# Patient Record
Sex: FEMALE | Race: WHITE | NOT HISPANIC OR LATINO | URBAN - METROPOLITAN AREA
[De-identification: names, ages, dates, MRNs, and addresses within clinical notes are randomized per-mention and may not be internally consistent; named-entity substitution may affect disease eponyms.]

---

## 2018-10-22 ENCOUNTER — EMERGENCY (EMERGENCY)
Facility: HOSPITAL | Age: 83
LOS: 0 days | Discharge: HOME | End: 2018-10-22
Attending: EMERGENCY MEDICINE | Admitting: EMERGENCY MEDICINE
Payer: MEDICARE

## 2018-10-22 VITALS
RESPIRATION RATE: 18 BRPM | OXYGEN SATURATION: 94 % | SYSTOLIC BLOOD PRESSURE: 170 MMHG | WEIGHT: 143.08 LBS | TEMPERATURE: 99 F | DIASTOLIC BLOOD PRESSURE: 92 MMHG | HEART RATE: 64 BPM | HEIGHT: 64 IN

## 2018-10-22 VITALS — SYSTOLIC BLOOD PRESSURE: 165 MMHG | DIASTOLIC BLOOD PRESSURE: 87 MMHG | RESPIRATION RATE: 16 BRPM | HEART RATE: 68 BPM
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
## 2018-10-22 DIAGNOSIS — S09.90XA UNSPECIFIED INJURY OF HEAD, INITIAL ENCOUNTER: ICD-10-CM

## 2018-10-22 DIAGNOSIS — I10 ESSENTIAL (PRIMARY) HYPERTENSION: ICD-10-CM

## 2018-10-22 DIAGNOSIS — M48.00 SPINAL STENOSIS, SITE UNSPECIFIED: ICD-10-CM

## 2018-10-22 DIAGNOSIS — E11.9 TYPE 2 DIABETES MELLITUS WITHOUT COMPLICATIONS: ICD-10-CM

## 2018-10-22 DIAGNOSIS — F03.90 UNSPECIFIED DEMENTIA WITHOUT BEHAVIORAL DISTURBANCE: ICD-10-CM
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
## 2018-10-22 PROCEDURE — 93010 ELECTROCARDIOGRAM REPORT: CPT

## 2018-10-22 PROCEDURE — 71045 X-RAY EXAM CHEST 1 VIEW: CPT | Mod: 26

## 2018-10-22 PROCEDURE — 72125 CT NECK SPINE W/O DYE: CPT | Mod: 26

## 2018-10-22 PROCEDURE — 72170 X-RAY EXAM OF PELVIS: CPT | Mod: 26

## 2018-10-22 PROCEDURE — 70450 CT HEAD/BRAIN W/O DYE: CPT | Mod: 26

## 2018-10-22 PROCEDURE — 99284 EMERGENCY DEPT VISIT MOD MDM: CPT

## 2018-10-22 NOTE — ED PROVIDER NOTE - MEDICAL DECISION MAKING DETAILS
89 y/o f with hx of dementia, on baby ASA, BIBA from assisted living s/p witnessed fall with questionable head injury. Pt without memory of accident, PE unremarkable. Plan for trauma alert, CT head and c-spine, XR chest/pelvis, EKG, observe, reevaluate. Ambulation trial if studies negative.

## 2018-10-22 NOTE — ED PROVIDER NOTE - MUSCULOSKELETAL, MLM
Spine appears normal, range of motion is not limited, no muscle or joint tenderness. BL SLR 45 degrees without pain, good FROM.  Neck nontender, supple without pain. Chest wall nontender, stable. pelvis nontender, stable. Back nontender, stable. BEAVERS x4. Spine appears normal, range of motion is not limited, no muscle or joint tenderness. BL SLR 45 degrees without pain, good AFROM all large jts w/o pain.  Neck nontender, supple without pain. Chest wall nontender, stable. pelvis nontender, stable. Back nontender, stable. BEAVERS x4.

## 2018-10-22 NOTE — ED ADULT TRIAGE NOTE - CHIEF COMPLAINT QUOTE
pt BIBEMS s/p witnessed mechanical fall @ NH. no LOC. + head strike. c/o pain to R shoulder and L hip. on daily ASA. L leg not noted to be shortened or rotated. pt w/ dementia. trauma alert activated upon arrival to ED.

## 2018-10-22 NOTE — ED ADULT NURSE NOTE - NSIMPLEMENTINTERV_GEN_ALL_ED
Implemented All Fall with Harm Risk Interventions:  [place] to call system. Call bell, personal items and telephone within reach. Instruct patient to call for assistance. Room bathroom lighting operational. Non-slip footwear when patient is off stretcher. Physically safe environment: no spills, clutter or unnecessary equipment. Stretcher in lowest position, wheels locked, appropriate side rails in place. Provide visual cue, wrist band, yellow gown, etc. Monitor gait and stability. Monitor for mental status changes and reorient to person, place, and time. Review medications for side effects contributing to fall risk. Reinforce activity limits and safety measures with patient and family. Provide visual clues: red socks.

## 2018-10-22 NOTE — ED PROVIDER NOTE - NEUROLOGICAL, MLM
Alert and oriented to person and place, no focal deficits, no motor or sensory deficits. Cranial nerves 2-12 intact. No speech deficit.

## 2018-10-22 NOTE — ED PROVIDER NOTE - OBJECTIVE STATEMENT
89 y/o f with PMHx of dementia, DM2, HTN, spinal stenosis presenting to the ED BIBEMS from [facility] assisted living for evaluation s/p witnessed fall from standing today. Pt c/o right shoulder pain, right HA, left hip pain as per EMS. On 81mg ASA. Pt unable to provide hx secondary to dementia, with retrograde amnesia of event, denies any pain currently.

## 2018-10-22 NOTE — ED PROVIDER NOTE - SKIN, MLM
Skin normal color for race, warm, dry and intact. No evidence of rash. No obvious external evidence of trauma

## 2018-12-15 ENCOUNTER — EMERGENCY (EMERGENCY)
Facility: HOSPITAL | Age: 83
LOS: 0 days | Discharge: HOME | End: 2018-12-15
Attending: EMERGENCY MEDICINE | Admitting: EMERGENCY MEDICINE
Payer: MEDICARE

## 2018-12-15 VITALS
DIASTOLIC BLOOD PRESSURE: 52 MMHG | RESPIRATION RATE: 18 BRPM | OXYGEN SATURATION: 96 % | HEART RATE: 70 BPM | WEIGHT: 169.98 LBS | TEMPERATURE: 98 F | HEIGHT: 65 IN | SYSTOLIC BLOOD PRESSURE: 143 MMHG

## 2018-12-15 VITALS
SYSTOLIC BLOOD PRESSURE: 141 MMHG | DIASTOLIC BLOOD PRESSURE: 51 MMHG | OXYGEN SATURATION: 97 % | HEART RATE: 60 BPM | RESPIRATION RATE: 17 BRPM | TEMPERATURE: 98 F

## 2018-12-15 DIAGNOSIS — E11.9 TYPE 2 DIABETES MELLITUS WITHOUT COMPLICATIONS: ICD-10-CM

## 2018-12-15 DIAGNOSIS — Z04.3 ENCOUNTER FOR EXAMINATION AND OBSERVATION FOLLOWING OTHER ACCIDENT: ICD-10-CM

## 2018-12-15 DIAGNOSIS — I10 ESSENTIAL (PRIMARY) HYPERTENSION: ICD-10-CM

## 2018-12-15 DIAGNOSIS — F03.90 UNSPECIFIED DEMENTIA WITHOUT BEHAVIORAL DISTURBANCE: ICD-10-CM

## 2018-12-15 DIAGNOSIS — Z79.84 LONG TERM (CURRENT) USE OF ORAL HYPOGLYCEMIC DRUGS: ICD-10-CM
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
## 2018-12-15 PROBLEM — M48.00 SPINAL STENOSIS, SITE UNSPECIFIED: Chronic | Status: ACTIVE | Noted: 2018-10-22

## 2018-12-15 PROCEDURE — 70450 CT HEAD/BRAIN W/O DYE: CPT | Mod: 26

## 2018-12-15 PROCEDURE — 72125 CT NECK SPINE W/O DYE: CPT | Mod: 26

## 2018-12-15 PROCEDURE — 99284 EMERGENCY DEPT VISIT MOD MDM: CPT | Mod: 25

## 2018-12-15 NOTE — ED ADULT NURSE NOTE - NSIMPLEMENTINTERV_GEN_ALL_ED
Implemented All Fall with Harm Risk Interventions:  [place] to call system. Call bell, personal items and telephone within reach. Instruct patient to call for assistance. Room bathroom lighting operational. Non-slip footwear when patient is off stretcher. Physically safe environment: no spills, clutter or unnecessary equipment. Stretcher in lowest position, wheels locked, appropriate side rails in place. Provide visual cue, wrist band, yellow gown, etc. Monitor gait and stability. Monitor for mental status changes and reorient to person, place, and time. Review medications for side effects contributing to fall risk. Reinforce activity limits and safety measures with patient and family. Provide visual clues: red socks.

## 2018-12-15 NOTE — ED ADULT NURSE NOTE - OBJECTIVE STATEMENT
Pt presents to ER from assisted living s/p witnessed fall out of bed. Pt denies pain or any complaints. Denies hitting head. No signs of bleeding/trauma/deformity. Pt confused at baseline. Normal breathing pattern with no difficulty, equal b/l chest discomfort.

## 2018-12-15 NOTE — ED PROVIDER NOTE - OBJECTIVE STATEMENT
89 y/o female in ED from NH s/p fall.   as per EMS, unwitnessed fall with pt found on floor.   pt denies any complaints.   no obvious trauma noted.

## 2018-12-15 NOTE — ED ADULT TRIAGE NOTE - CHIEF COMPLAINT QUOTE
Pt presents to the ED from [place], for unwitnessed fall, pt denies any pain at triage, at baseline mental status at triage. Pt denies hitting head at triage, denies head pain.

## 2018-12-15 NOTE — ED ADULT NURSE NOTE - CHIEF COMPLAINT QUOTE
Pt presents to the ED from [place], for unwitnessed fall, pt denies any pain at triage, at baseline mental status at triage. Pt denies hitting head at triage, denies head pain.

## 2018-12-18 ENCOUNTER — INPATIENT (INPATIENT)
Facility: HOSPITAL | Age: 83
LOS: 3 days | Discharge: NURSING, REHAB OR LONG-TERM CARE | End: 2018-12-22
Attending: INTERNAL MEDICINE | Admitting: INTERNAL MEDICINE
Payer: MEDICARE

## 2018-12-18 VITALS
DIASTOLIC BLOOD PRESSURE: 54 MMHG | HEIGHT: 64 IN | HEART RATE: 61 BPM | OXYGEN SATURATION: 95 % | TEMPERATURE: 98 F | RESPIRATION RATE: 20 BRPM | SYSTOLIC BLOOD PRESSURE: 147 MMHG | WEIGHT: 139.99 LBS

## 2018-12-18 NOTE — ED PROVIDER NOTE - MUSCULOSKELETAL, MLM
Spine appears normal, range of motion is not limited, ttp left shoulder, no swelling, no ecchymoisis no abrasion, pain on movement left hip with grimacing on movement

## 2018-12-18 NOTE — ED PROVIDER NOTE - OBJECTIVE STATEMENT
89 yo female biba from nh with ho dementia on aspirin with fall c/o left arm and left hip pain. no visible signs of trauma pt is oriented to person only. as per ems no loc

## 2018-12-19 LAB
ABO RH CONFIRMATION: SIGNIFICANT CHANGE UP
ALBUMIN SERPL ELPH-MCNC: 3.2 G/DL — LOW (ref 3.3–5)
ALBUMIN SERPL ELPH-MCNC: 3.5 G/DL — SIGNIFICANT CHANGE UP (ref 3.3–5)
ALP SERPL-CCNC: 93 U/L — SIGNIFICANT CHANGE UP (ref 40–120)
ALP SERPL-CCNC: 93 U/L — SIGNIFICANT CHANGE UP (ref 40–120)
ALT FLD-CCNC: 22 U/L — SIGNIFICANT CHANGE UP (ref 12–78)
ALT FLD-CCNC: 26 U/L — SIGNIFICANT CHANGE UP (ref 12–78)
ANION GAP SERPL CALC-SCNC: 8 MMOL/L — SIGNIFICANT CHANGE UP (ref 5–17)
ANION GAP SERPL CALC-SCNC: 8 MMOL/L — SIGNIFICANT CHANGE UP (ref 5–17)
APPEARANCE UR: ABNORMAL
APTT BLD: 28.8 SEC — SIGNIFICANT CHANGE UP (ref 27.5–36.3)
APTT BLD: 29.1 SEC — SIGNIFICANT CHANGE UP (ref 27.5–36.3)
AST SERPL-CCNC: 15 U/L — SIGNIFICANT CHANGE UP (ref 15–37)
AST SERPL-CCNC: 19 U/L — SIGNIFICANT CHANGE UP (ref 15–37)
BACTERIA # UR AUTO: ABNORMAL
BASOPHILS # BLD AUTO: 0.02 K/UL — SIGNIFICANT CHANGE UP (ref 0–0.2)
BASOPHILS # BLD AUTO: 0.04 K/UL — SIGNIFICANT CHANGE UP (ref 0–0.2)
BASOPHILS NFR BLD AUTO: 0.1 % — SIGNIFICANT CHANGE UP (ref 0–2)
BASOPHILS NFR BLD AUTO: 0.2 % — SIGNIFICANT CHANGE UP (ref 0–2)
BILIRUB SERPL-MCNC: 0.2 MG/DL — SIGNIFICANT CHANGE UP (ref 0.2–1.2)
BILIRUB SERPL-MCNC: 0.2 MG/DL — SIGNIFICANT CHANGE UP (ref 0.2–1.2)
BILIRUB UR-MCNC: NEGATIVE — SIGNIFICANT CHANGE UP
BLD GP AB SCN SERPL QL: SIGNIFICANT CHANGE UP
BUN SERPL-MCNC: 26 MG/DL — HIGH (ref 7–23)
BUN SERPL-MCNC: 26 MG/DL — HIGH (ref 7–23)
CALCIUM SERPL-MCNC: 8.9 MG/DL — SIGNIFICANT CHANGE UP (ref 8.5–10.1)
CALCIUM SERPL-MCNC: 9.6 MG/DL — SIGNIFICANT CHANGE UP (ref 8.5–10.1)
CHLORIDE SERPL-SCNC: 105 MMOL/L — SIGNIFICANT CHANGE UP (ref 96–108)
CHLORIDE SERPL-SCNC: 105 MMOL/L — SIGNIFICANT CHANGE UP (ref 96–108)
CO2 SERPL-SCNC: 26 MMOL/L — SIGNIFICANT CHANGE UP (ref 22–31)
CO2 SERPL-SCNC: 27 MMOL/L — SIGNIFICANT CHANGE UP (ref 22–31)
COLOR SPEC: YELLOW — SIGNIFICANT CHANGE UP
CREAT SERPL-MCNC: 1.2 MG/DL — SIGNIFICANT CHANGE UP (ref 0.5–1.3)
CREAT SERPL-MCNC: 1.21 MG/DL — SIGNIFICANT CHANGE UP (ref 0.5–1.3)
DIFF PNL FLD: ABNORMAL
EOSINOPHIL # BLD AUTO: 0.01 K/UL — SIGNIFICANT CHANGE UP (ref 0–0.5)
EOSINOPHIL # BLD AUTO: 0.02 K/UL — SIGNIFICANT CHANGE UP (ref 0–0.5)
EOSINOPHIL NFR BLD AUTO: 0.1 % — SIGNIFICANT CHANGE UP (ref 0–6)
EOSINOPHIL NFR BLD AUTO: 0.1 % — SIGNIFICANT CHANGE UP (ref 0–6)
EPI CELLS # UR: SIGNIFICANT CHANGE UP
GLUCOSE BLDC GLUCOMTR-MCNC: 165 MG/DL — HIGH (ref 70–99)
GLUCOSE BLDC GLUCOMTR-MCNC: 190 MG/DL — HIGH (ref 70–99)
GLUCOSE BLDC GLUCOMTR-MCNC: 192 MG/DL — HIGH (ref 70–99)
GLUCOSE BLDC GLUCOMTR-MCNC: 215 MG/DL — HIGH (ref 70–99)
GLUCOSE SERPL-MCNC: 221 MG/DL — HIGH (ref 70–99)
GLUCOSE SERPL-MCNC: 236 MG/DL — HIGH (ref 70–99)
GLUCOSE UR QL: 100 MG/DL
HBA1C BLD-MCNC: 6.3 % — HIGH (ref 4–5.6)
HCT VFR BLD CALC: 31.8 % — LOW (ref 34.5–45)
HCT VFR BLD CALC: 34.2 % — LOW (ref 34.5–45)
HGB BLD-MCNC: 10.3 G/DL — LOW (ref 11.5–15.5)
HGB BLD-MCNC: 10.9 G/DL — LOW (ref 11.5–15.5)
IMM GRANULOCYTES NFR BLD AUTO: 0.5 % — SIGNIFICANT CHANGE UP (ref 0–1.5)
IMM GRANULOCYTES NFR BLD AUTO: 0.5 % — SIGNIFICANT CHANGE UP (ref 0–1.5)
INR BLD: 1.04 RATIO — SIGNIFICANT CHANGE UP (ref 0.88–1.16)
INR BLD: 1.08 RATIO — SIGNIFICANT CHANGE UP (ref 0.88–1.16)
KETONES UR-MCNC: ABNORMAL
LACTATE SERPL-SCNC: 2.4 MMOL/L — HIGH (ref 0.7–2)
LACTATE SERPL-SCNC: 2.5 MMOL/L — HIGH (ref 0.7–2)
LEUKOCYTE ESTERASE UR-ACNC: ABNORMAL
LYMPHOCYTES # BLD AUTO: 1.52 K/UL — SIGNIFICANT CHANGE UP (ref 1–3.3)
LYMPHOCYTES # BLD AUTO: 1.67 K/UL — SIGNIFICANT CHANGE UP (ref 1–3.3)
LYMPHOCYTES # BLD AUTO: 11.1 % — LOW (ref 13–44)
LYMPHOCYTES # BLD AUTO: 8.8 % — LOW (ref 13–44)
MAGNESIUM SERPL-MCNC: 2 MG/DL — SIGNIFICANT CHANGE UP (ref 1.6–2.6)
MCHC RBC-ENTMCNC: 31.2 PG — SIGNIFICANT CHANGE UP (ref 27–34)
MCHC RBC-ENTMCNC: 31.6 PG — SIGNIFICANT CHANGE UP (ref 27–34)
MCHC RBC-ENTMCNC: 31.9 GM/DL — LOW (ref 32–36)
MCHC RBC-ENTMCNC: 32.4 GM/DL — SIGNIFICANT CHANGE UP (ref 32–36)
MCV RBC AUTO: 96.4 FL — SIGNIFICANT CHANGE UP (ref 80–100)
MCV RBC AUTO: 99.1 FL — SIGNIFICANT CHANGE UP (ref 80–100)
MONOCYTES # BLD AUTO: 0.77 K/UL — SIGNIFICANT CHANGE UP (ref 0–0.9)
MONOCYTES # BLD AUTO: 0.96 K/UL — HIGH (ref 0–0.9)
MONOCYTES NFR BLD AUTO: 5.1 % — SIGNIFICANT CHANGE UP (ref 2–14)
MONOCYTES NFR BLD AUTO: 5.5 % — SIGNIFICANT CHANGE UP (ref 2–14)
NEUTROPHILS # BLD AUTO: 12.45 K/UL — HIGH (ref 1.8–7.4)
NEUTROPHILS # BLD AUTO: 14.68 K/UL — HIGH (ref 1.8–7.4)
NEUTROPHILS NFR BLD AUTO: 83.1 % — HIGH (ref 43–77)
NEUTROPHILS NFR BLD AUTO: 84.9 % — HIGH (ref 43–77)
NITRITE UR-MCNC: POSITIVE
NRBC # BLD: 0 /100 WBCS — SIGNIFICANT CHANGE UP (ref 0–0)
NRBC # BLD: 0 /100 WBCS — SIGNIFICANT CHANGE UP (ref 0–0)
PH UR: 7 — SIGNIFICANT CHANGE UP (ref 5–8)
PHOSPHATE SERPL-MCNC: 3.1 MG/DL — SIGNIFICANT CHANGE UP (ref 2.5–4.5)
PLATELET # BLD AUTO: 261 K/UL — SIGNIFICANT CHANGE UP (ref 150–400)
PLATELET # BLD AUTO: 269 K/UL — SIGNIFICANT CHANGE UP (ref 150–400)
POTASSIUM SERPL-MCNC: 4.5 MMOL/L — SIGNIFICANT CHANGE UP (ref 3.5–5.3)
POTASSIUM SERPL-MCNC: 4.7 MMOL/L — SIGNIFICANT CHANGE UP (ref 3.5–5.3)
POTASSIUM SERPL-SCNC: 4.5 MMOL/L — SIGNIFICANT CHANGE UP (ref 3.5–5.3)
POTASSIUM SERPL-SCNC: 4.7 MMOL/L — SIGNIFICANT CHANGE UP (ref 3.5–5.3)
PROT SERPL-MCNC: 7.5 GM/DL — SIGNIFICANT CHANGE UP (ref 6–8.3)
PROT SERPL-MCNC: 8.1 GM/DL — SIGNIFICANT CHANGE UP (ref 6–8.3)
PROT UR-MCNC: 30 MG/DL
PROTHROM AB SERPL-ACNC: 11.6 SEC — SIGNIFICANT CHANGE UP (ref 10–12.9)
PROTHROM AB SERPL-ACNC: 12 SEC — SIGNIFICANT CHANGE UP (ref 10–12.9)
RBC # BLD: 3.3 M/UL — LOW (ref 3.8–5.2)
RBC # BLD: 3.45 M/UL — LOW (ref 3.8–5.2)
RBC # FLD: 12.2 % — SIGNIFICANT CHANGE UP (ref 10.3–14.5)
RBC # FLD: 12.2 % — SIGNIFICANT CHANGE UP (ref 10.3–14.5)
RBC CASTS # UR COMP ASSIST: ABNORMAL /HPF (ref 0–4)
SODIUM SERPL-SCNC: 139 MMOL/L — SIGNIFICANT CHANGE UP (ref 135–145)
SODIUM SERPL-SCNC: 140 MMOL/L — SIGNIFICANT CHANGE UP (ref 135–145)
SP GR SPEC: 1.01 — SIGNIFICANT CHANGE UP (ref 1.01–1.02)
TYPE + AB SCN PNL BLD: SIGNIFICANT CHANGE UP
UROBILINOGEN FLD QL: 1 MG/DL
WBC # BLD: 14.99 K/UL — HIGH (ref 3.8–10.5)
WBC # BLD: 17.31 K/UL — HIGH (ref 3.8–10.5)
WBC # FLD AUTO: 14.99 K/UL — HIGH (ref 3.8–10.5)
WBC # FLD AUTO: 17.31 K/UL — HIGH (ref 3.8–10.5)
WBC UR QL: SIGNIFICANT CHANGE UP

## 2018-12-19 PROCEDURE — 71045 X-RAY EXAM CHEST 1 VIEW: CPT | Mod: 26

## 2018-12-19 PROCEDURE — 99233 SBSQ HOSP IP/OBS HIGH 50: CPT

## 2018-12-19 PROCEDURE — 70450 CT HEAD/BRAIN W/O DYE: CPT | Mod: 26

## 2018-12-19 PROCEDURE — 72125 CT NECK SPINE W/O DYE: CPT | Mod: 26

## 2018-12-19 PROCEDURE — 73503 X-RAY EXAM HIP UNI 4/> VIEWS: CPT | Mod: 26,LT

## 2018-12-19 PROCEDURE — 72192 CT PELVIS W/O DYE: CPT | Mod: 26

## 2018-12-19 PROCEDURE — 73030 X-RAY EXAM OF SHOULDER: CPT | Mod: 26,LT

## 2018-12-19 PROCEDURE — 99285 EMERGENCY DEPT VISIT HI MDM: CPT

## 2018-12-19 PROCEDURE — 76376 3D RENDER W/INTRP POSTPROCES: CPT | Mod: 26

## 2018-12-19 PROCEDURE — 93010 ELECTROCARDIOGRAM REPORT: CPT

## 2018-12-19 RX ORDER — MORPHINE SULFATE 50 MG/1
2 CAPSULE, EXTENDED RELEASE ORAL ONCE
Qty: 0 | Refills: 0 | Status: DISCONTINUED | OUTPATIENT
Start: 2018-12-19 | End: 2018-12-19

## 2018-12-19 RX ORDER — DOCUSATE SODIUM 100 MG
100 CAPSULE ORAL THREE TIMES A DAY
Qty: 0 | Refills: 0 | Status: DISCONTINUED | OUTPATIENT
Start: 2018-12-19 | End: 2018-12-22

## 2018-12-19 RX ORDER — DEXTROSE 50 % IN WATER 50 %
12.5 SYRINGE (ML) INTRAVENOUS ONCE
Qty: 0 | Refills: 0 | Status: DISCONTINUED | OUTPATIENT
Start: 2018-12-19 | End: 2018-12-22

## 2018-12-19 RX ORDER — CEFTRIAXONE 500 MG/1
1000 INJECTION, POWDER, FOR SOLUTION INTRAMUSCULAR; INTRAVENOUS EVERY 24 HOURS
Qty: 0 | Refills: 0 | Status: DISCONTINUED | OUTPATIENT
Start: 2018-12-20 | End: 2018-12-22

## 2018-12-19 RX ORDER — IBUPROFEN 200 MG
400 TABLET ORAL EVERY 8 HOURS
Qty: 0 | Refills: 0 | Status: COMPLETED | OUTPATIENT
Start: 2018-12-19 | End: 2018-12-22

## 2018-12-19 RX ORDER — CEFTRIAXONE 500 MG/1
INJECTION, POWDER, FOR SOLUTION INTRAMUSCULAR; INTRAVENOUS
Qty: 0 | Refills: 0 | Status: DISCONTINUED | OUTPATIENT
Start: 2018-12-19 | End: 2018-12-22

## 2018-12-19 RX ORDER — MEMANTINE HYDROCHLORIDE 10 MG/1
10 TABLET ORAL
Qty: 0 | Refills: 0 | Status: DISCONTINUED | OUTPATIENT
Start: 2018-12-19 | End: 2018-12-22

## 2018-12-19 RX ORDER — DEXTROSE 50 % IN WATER 50 %
25 SYRINGE (ML) INTRAVENOUS ONCE
Qty: 0 | Refills: 0 | Status: DISCONTINUED | OUTPATIENT
Start: 2018-12-19 | End: 2018-12-22

## 2018-12-19 RX ORDER — GLUCAGON INJECTION, SOLUTION 0.5 MG/.1ML
1 INJECTION, SOLUTION SUBCUTANEOUS ONCE
Qty: 0 | Refills: 0 | Status: DISCONTINUED | OUTPATIENT
Start: 2018-12-19 | End: 2018-12-22

## 2018-12-19 RX ORDER — BUPROPION HYDROCHLORIDE 150 MG/1
150 TABLET, EXTENDED RELEASE ORAL DAILY
Qty: 0 | Refills: 0 | Status: DISCONTINUED | OUTPATIENT
Start: 2018-12-19 | End: 2018-12-22

## 2018-12-19 RX ORDER — CHOLECALCIFEROL (VITAMIN D3) 125 MCG
3000 CAPSULE ORAL DAILY
Qty: 0 | Refills: 0 | Status: DISCONTINUED | OUTPATIENT
Start: 2018-12-19 | End: 2018-12-22

## 2018-12-19 RX ORDER — MIRTAZAPINE 45 MG/1
30 TABLET, ORALLY DISINTEGRATING ORAL AT BEDTIME
Qty: 0 | Refills: 0 | Status: DISCONTINUED | OUTPATIENT
Start: 2018-12-19 | End: 2018-12-22

## 2018-12-19 RX ORDER — CHOLECALCIFEROL (VITAMIN D3) 125 MCG
1 CAPSULE ORAL
Qty: 0 | Refills: 0 | COMMUNITY

## 2018-12-19 RX ORDER — INSULIN LISPRO 100/ML
VIAL (ML) SUBCUTANEOUS
Qty: 0 | Refills: 0 | Status: DISCONTINUED | OUTPATIENT
Start: 2018-12-19 | End: 2018-12-22

## 2018-12-19 RX ORDER — DEXTROSE 50 % IN WATER 50 %
15 SYRINGE (ML) INTRAVENOUS ONCE
Qty: 0 | Refills: 0 | Status: DISCONTINUED | OUTPATIENT
Start: 2018-12-19 | End: 2018-12-22

## 2018-12-19 RX ORDER — ACETAMINOPHEN 500 MG
650 TABLET ORAL EVERY 4 HOURS
Qty: 0 | Refills: 0 | Status: DISCONTINUED | OUTPATIENT
Start: 2018-12-19 | End: 2018-12-22

## 2018-12-19 RX ORDER — OXYCODONE HYDROCHLORIDE 5 MG/1
5 TABLET ORAL EVERY 6 HOURS
Qty: 0 | Refills: 0 | Status: DISCONTINUED | OUTPATIENT
Start: 2018-12-19 | End: 2018-12-22

## 2018-12-19 RX ORDER — BUPROPION HYDROCHLORIDE 150 MG/1
1 TABLET, EXTENDED RELEASE ORAL
Qty: 0 | Refills: 0 | COMMUNITY

## 2018-12-19 RX ORDER — CEFTRIAXONE 500 MG/1
INJECTION, POWDER, FOR SOLUTION INTRAMUSCULAR; INTRAVENOUS
Qty: 0 | Refills: 0 | Status: DISCONTINUED | OUTPATIENT
Start: 2018-12-19 | End: 2018-12-19

## 2018-12-19 RX ORDER — MORPHINE SULFATE 50 MG/1
2 CAPSULE, EXTENDED RELEASE ORAL EVERY 6 HOURS
Qty: 0 | Refills: 0 | Status: DISCONTINUED | OUTPATIENT
Start: 2018-12-19 | End: 2018-12-22

## 2018-12-19 RX ORDER — SODIUM CHLORIDE 9 MG/ML
1000 INJECTION, SOLUTION INTRAVENOUS
Qty: 0 | Refills: 0 | Status: DISCONTINUED | OUTPATIENT
Start: 2018-12-19 | End: 2018-12-22

## 2018-12-19 RX ORDER — CEFTRIAXONE 500 MG/1
1000 INJECTION, POWDER, FOR SOLUTION INTRAMUSCULAR; INTRAVENOUS ONCE
Qty: 0 | Refills: 0 | Status: COMPLETED | OUTPATIENT
Start: 2018-12-19 | End: 2018-12-19

## 2018-12-19 RX ORDER — CEFTRIAXONE 500 MG/1
1 INJECTION, POWDER, FOR SOLUTION INTRAMUSCULAR; INTRAVENOUS ONCE
Qty: 0 | Refills: 0 | Status: DISCONTINUED | OUTPATIENT
Start: 2018-12-19 | End: 2018-12-19

## 2018-12-19 RX ORDER — METFORMIN HYDROCHLORIDE 850 MG/1
1 TABLET ORAL
Qty: 0 | Refills: 0 | COMMUNITY

## 2018-12-19 RX ORDER — SODIUM CHLORIDE 9 MG/ML
500 INJECTION INTRAMUSCULAR; INTRAVENOUS; SUBCUTANEOUS
Qty: 0 | Refills: 0 | Status: DISCONTINUED | OUTPATIENT
Start: 2018-12-19 | End: 2018-12-22

## 2018-12-19 RX ORDER — LANOLIN ALCOHOL/MO/W.PET/CERES
5 CREAM (GRAM) TOPICAL AT BEDTIME
Qty: 0 | Refills: 0 | Status: DISCONTINUED | OUTPATIENT
Start: 2018-12-19 | End: 2018-12-22

## 2018-12-19 RX ORDER — SODIUM CHLORIDE 9 MG/ML
500 INJECTION INTRAMUSCULAR; INTRAVENOUS; SUBCUTANEOUS ONCE
Qty: 0 | Refills: 0 | Status: COMPLETED | OUTPATIENT
Start: 2018-12-19 | End: 2018-12-19

## 2018-12-19 RX ORDER — MORPHINE SULFATE 50 MG/1
2 CAPSULE, EXTENDED RELEASE ORAL EVERY 4 HOURS
Qty: 0 | Refills: 0 | Status: DISCONTINUED | OUTPATIENT
Start: 2018-12-19 | End: 2018-12-19

## 2018-12-19 RX ADMIN — Medication 2: at 17:38

## 2018-12-19 RX ADMIN — MEMANTINE HYDROCHLORIDE 10 MILLIGRAM(S): 10 TABLET ORAL at 05:54

## 2018-12-19 RX ADMIN — Medication 400 MILLIGRAM(S): at 23:41

## 2018-12-19 RX ADMIN — SODIUM CHLORIDE 500 MILLILITER(S): 9 INJECTION INTRAMUSCULAR; INTRAVENOUS; SUBCUTANEOUS at 05:46

## 2018-12-19 RX ADMIN — Medication 1: at 12:48

## 2018-12-19 RX ADMIN — Medication 5 MILLIGRAM(S): at 23:41

## 2018-12-19 RX ADMIN — BUPROPION HYDROCHLORIDE 150 MILLIGRAM(S): 150 TABLET, EXTENDED RELEASE ORAL at 12:47

## 2018-12-19 RX ADMIN — Medication 3000 UNIT(S): at 12:47

## 2018-12-19 RX ADMIN — Medication 400 MILLIGRAM(S): at 23:42

## 2018-12-19 RX ADMIN — MORPHINE SULFATE 2 MILLIGRAM(S): 50 CAPSULE, EXTENDED RELEASE ORAL at 05:46

## 2018-12-19 RX ADMIN — CEFTRIAXONE 1000 MILLIGRAM(S): 500 INJECTION, POWDER, FOR SOLUTION INTRAMUSCULAR; INTRAVENOUS at 05:54

## 2018-12-19 RX ADMIN — MORPHINE SULFATE 2 MILLIGRAM(S): 50 CAPSULE, EXTENDED RELEASE ORAL at 06:01

## 2018-12-19 RX ADMIN — MIRTAZAPINE 30 MILLIGRAM(S): 45 TABLET, ORALLY DISINTEGRATING ORAL at 22:03

## 2018-12-19 RX ADMIN — Medication 400 MILLIGRAM(S): at 16:30

## 2018-12-19 RX ADMIN — Medication 400 MILLIGRAM(S): at 15:59

## 2018-12-19 NOTE — PHYSICAL THERAPY INITIAL EVALUATION ADULT - RANGE OF MOTION EXAMINATION, REHAB EVAL
left shoulder NT 2/2 pain/bilateral upper extremity ROM was WFL (within functional limits)/bilateral lower extremity ROM was WFL (within functional limits)

## 2018-12-19 NOTE — CONSULT NOTE ADULT - SUBJECTIVE AND OBJECTIVE BOX
89F with hx of dementia presents to ED from nursing home s/p mechanical fall. Pt now c/o L hip and shoulder pain. Denies HS/LOC. Pt unreliable historian and full history unable to be obtained at this time. Pt oriented to person only. Pt denies any other orthopedic complaints at this time. Unknown if any orthopedic surgeries in the past.    CONSTITUTIONAL: No fever or chills  HEENT:  No headache, no sore throat  RESPIRATORY: No cough, wheezing, or shortness of breath  CARDIOVASCULAR: No chest pain, palpitations, or leg swelling  GASTROINTESTINAL: No nausea, vomiting, or diarrhea  GENITOURINARY: No dysuria, frequency, or hematuria  NEUROLOGICAL: no focal weakness or dizziness  SKIN:  No rashes or lesions   MUSCULOSKELETAL: no myalgias   PSYCHIATRIC: No depression or anxiety    PAST MEDICAL & SURGICAL HISTORY:  Spinal stenosis  Diabetes  HTN (hypertension)  Dementia    Home Medications:  buPROPion 100 mg/12 hours (SR) oral tablet, extended release: 1 tab(s) orally 2 times a day (22 Oct 2018 19:05)  Melatonin 5 mg oral tablet: 1 tab(s) orally once a day (at bedtime) (22 Oct 2018 19:05)  metFORMIN 500 mg oral tablet: 1 tab(s) orally 2 times a day (22 Oct 2018 19:05)  mirtazapine 30 mg oral tablet: 1 tab(s) orally once a day (at bedtime) (22 Oct 2018 19:05)  Namenda 10 mg oral tablet: 1 tab(s) orally 2 times a day (22 Oct 2018 19:05)  pravastatin 40 mg oral tablet: 1 tab(s) orally once a day (22 Oct 2018 19:05)  Vitamin D3 2000 intl units oral capsule: 1 cap(s) orally once a day (22 Oct 2018 19:05)    Allergies    No Known Allergies    Intolerances    Vital Signs Last 24 Hrs  T(C): 36.5 (18 Dec 2018 22:53), Max: 36.5 (18 Dec 2018 22:53)  T(F): 97.7 (18 Dec 2018 22:53), Max: 97.7 (18 Dec 2018 22:53)  HR: 61 (18 Dec 2018 22:53) (61 - 61)  BP: 147/54 (18 Dec 2018 22:53) (147/54 - 147/54)  BP(mean): --  RR: 20 (18 Dec 2018 22:53) (20 - 20)  SpO2: 95% (18 Dec 2018 22:53) (95% - 95%)    Imaging: XR L Shoulder: No obvious fractures (Awaiting official read)  XR/CT Pelvis: L nondisplaced medial wall and anterior acetabular dome fractures    Physical  Gen: NAD, AAOx1  LLE: Skin intact, no erythema/ecchymosis, no ttp over hip, no bony ttp elsewhere, +EHL/FHL/TA/GS, SILT, +dp pulse, compartments soft/compressible, unable to SLR, +pain with log roll/heel strike, no pain with ROM at knee/ankle    Secondary Survey: +ttp L shoulder, No TTP over other bony prominences, SILT, palpable pulses, full/painless range of motion, compartments soft

## 2018-12-19 NOTE — PHYSICAL THERAPY INITIAL EVALUATION ADULT - PERTINENT HX OF CURRENT PROBLEM, REHAB EVAL
89 y/o F with PMH of dementia, depression, HTN, spinal stenosis, DM2, p/w fall. Patient is a poor historian 2/2 dementia. Patient denies LOC, and only complaint is L hip pain. Found to have L acetabular fracture.

## 2018-12-19 NOTE — ED ADULT NURSE NOTE - NSIMPLEMENTINTERV_GEN_ALL_ED
Implemented All Fall with Harm Risk Interventions:  [place] to call system. Call bell, personal items and telephone within reach. Instruct patient to call for assistance. Room bathroom lighting operational. Non-slip footwear when patient is off stretcher. Physically safe environment: no spills, clutter or unnecessary equipment. Stretcher in lowest position, wheels locked, appropriate side rails in place. Provide visual cue, wrist band, yellow gown, etc. Monitor gait and stability. Monitor for mental status changes and reorient to person, place, and time. Review medications for side effects contributing to fall risk. Reinforce activity limits and safety measures with patient and family. Provide visual clues: red socks.

## 2018-12-19 NOTE — H&P ADULT - HISTORY OF PRESENT ILLNESS
89 y/o F with PMH of dementia, depression, HTN, spinal stenosis, DM2, p/w fall. Patient is a poor historian 2/2 dementia. Patient denies LOC, and only complaint is L hip pain. Found to have L acetabular fracture, and was evaluated by ortho at bedside. Ortho does not recommend surgical intervention at this time.    Denies CP, nausea, vomiting, abdominal pain, SOB, cough, runny nose, sore throat, diarrhea, urinary complaints    PSH: Denies    Social hx: Denies x 3, lives at [facility]    Family Hx: Denies

## 2018-12-19 NOTE — PHYSICAL THERAPY INITIAL EVALUATION ADULT - GENERAL OBSERVATIONS, REHAB EVAL
Pt rec'd supine in bed with dtr present to provide hx. Pt endorses left posterior shoulder pain,  cooperative with PT.

## 2018-12-19 NOTE — ED ADULT NURSE REASSESSMENT NOTE - NS ED NURSE REASSESS COMMENT FT1
Patient being treated with IV antibiotics for UTI, as per Dr Nair blood cultures not warranted at this time. Patient complaining of increased pain, MD made aware, pain medication ordered. Will Continue to monitor

## 2018-12-19 NOTE — ED ADULT NURSE REASSESSMENT NOTE - NS ED NURSE REASSESS COMMENT FT1
Received care of pt from Sarahi Underwood RN at change of shift. Pt is admitted to hospital with orders, awaiting report to floor. Pt taken for repeat x-ray. Pt is A&Ox1 which is baseline per offgoing RN

## 2018-12-19 NOTE — CONSULT NOTE ADULT - ASSESSMENT
89F s/p fall, presents with left acetabular fracture, no other acute injuries noted on imaging. .    - ortho plan for acetabular fracture  - no acute trauma surgery intervention needed at this point  - other plans per primary team  - pain control  - monitor vitals  - monitor labs  - no other vascular or neurological deficits associated with the injury

## 2018-12-19 NOTE — CONSULT NOTE ADULT - SUBJECTIVE AND OBJECTIVE BOX
89 y/o F with PMH of dementia, depression, HTN, spinal stenosis, DM2, p/w fall. Patient is a poor historian 2/2 dementia. Patient denies LOC, and only complaint is L hip pain. Found to have L acetabular fracture. Trauma surgery consulted for trauma evaluation and clearance.      Denies CP, nausea, vomiting, abdominal pain, SOB, cough, runny nose, sore throat, diarrhea, urinary complaints. Complaints of mild weakness on the left lower extremity. Patient currently is extremely confused, alert but not oriented.    PSH: Denies      Family Hx: Denies      Physical exam:    gen: alert but not oriented  pulm: equal air entry bilaterally  cv: normal s1s2  abdomen: soft, nontender, nondistended  left lower extremity with mild weakness  mild tender around the left hip on palpation  back : no step offs, non tender.

## 2018-12-19 NOTE — CONSULT NOTE ADULT - ASSESSMENT
89F with nondisplaced L medial wall/anterior dome acetabulum fx  Pain control  NWB LLE  DVT ppx  FU Judet xrays  FU official xray reads  No acute orthopedic surgery intervention planned at this time  Will discuss with attending and advise if plan changes

## 2018-12-19 NOTE — PROGRESS NOTE ADULT - ASSESSMENT
89 y old female S/p fall, with left acetabular and pubic rami fracture. HD stable C/P left shoulder pain x ray negative    Pain control  DVT/GI prophylaxis  Orthopedic following no intervention  NWB LLE  medcial management per primary servie  If shoulder pain is not improving may need MRI   PT  SW for D/C planning  No trauma surgical intervention .  D/W Family at [facility]

## 2018-12-19 NOTE — H&P ADULT - NEUROLOGICAL DETAILS
deep reflexes intact/cranial nerves intact/sensation intact/responds to verbal commands/responds to pain

## 2018-12-20 LAB
GLUCOSE BLDC GLUCOMTR-MCNC: 164 MG/DL — HIGH (ref 70–99)
GLUCOSE BLDC GLUCOMTR-MCNC: 180 MG/DL — HIGH (ref 70–99)
GLUCOSE BLDC GLUCOMTR-MCNC: 186 MG/DL — HIGH (ref 70–99)
GLUCOSE BLDC GLUCOMTR-MCNC: 190 MG/DL — HIGH (ref 70–99)
HCT VFR BLD CALC: 29.9 % — LOW (ref 34.5–45)
HGB BLD-MCNC: 9.7 G/DL — LOW (ref 11.5–15.5)
MCHC RBC-ENTMCNC: 31.8 PG — SIGNIFICANT CHANGE UP (ref 27–34)
MCHC RBC-ENTMCNC: 32.4 GM/DL — SIGNIFICANT CHANGE UP (ref 32–36)
MCV RBC AUTO: 98 FL — SIGNIFICANT CHANGE UP (ref 80–100)
NRBC # BLD: 0 /100 WBCS — SIGNIFICANT CHANGE UP (ref 0–0)
PLATELET # BLD AUTO: 234 K/UL — SIGNIFICANT CHANGE UP (ref 150–400)
RBC # BLD: 3.05 M/UL — LOW (ref 3.8–5.2)
RBC # FLD: 12.4 % — SIGNIFICANT CHANGE UP (ref 10.3–14.5)
WBC # BLD: 15.16 K/UL — HIGH (ref 3.8–10.5)
WBC # FLD AUTO: 15.16 K/UL — HIGH (ref 3.8–10.5)

## 2018-12-20 PROCEDURE — 93010 ELECTROCARDIOGRAM REPORT: CPT

## 2018-12-20 PROCEDURE — 73030 X-RAY EXAM OF SHOULDER: CPT | Mod: 26,LT

## 2018-12-20 RX ADMIN — MEMANTINE HYDROCHLORIDE 10 MILLIGRAM(S): 10 TABLET ORAL at 17:07

## 2018-12-20 RX ADMIN — Medication 1: at 08:34

## 2018-12-20 RX ADMIN — Medication 400 MILLIGRAM(S): at 17:08

## 2018-12-20 RX ADMIN — Medication 1: at 12:09

## 2018-12-20 RX ADMIN — Medication 400 MILLIGRAM(S): at 08:37

## 2018-12-20 RX ADMIN — Medication 5 MILLIGRAM(S): at 22:06

## 2018-12-20 RX ADMIN — MEMANTINE HYDROCHLORIDE 10 MILLIGRAM(S): 10 TABLET ORAL at 06:45

## 2018-12-20 RX ADMIN — Medication 1: at 17:08

## 2018-12-20 RX ADMIN — CEFTRIAXONE 1000 MILLIGRAM(S): 500 INJECTION, POWDER, FOR SOLUTION INTRAMUSCULAR; INTRAVENOUS at 06:45

## 2018-12-20 RX ADMIN — MIRTAZAPINE 30 MILLIGRAM(S): 45 TABLET, ORALLY DISINTEGRATING ORAL at 22:06

## 2018-12-20 RX ADMIN — BUPROPION HYDROCHLORIDE 150 MILLIGRAM(S): 150 TABLET, EXTENDED RELEASE ORAL at 12:08

## 2018-12-20 RX ADMIN — Medication 3000 UNIT(S): at 12:10

## 2018-12-21 LAB
GLUCOSE BLDC GLUCOMTR-MCNC: 127 MG/DL — HIGH (ref 70–99)
GLUCOSE BLDC GLUCOMTR-MCNC: 140 MG/DL — HIGH (ref 70–99)
GLUCOSE BLDC GLUCOMTR-MCNC: 160 MG/DL — HIGH (ref 70–99)
GLUCOSE BLDC GLUCOMTR-MCNC: 161 MG/DL — HIGH (ref 70–99)
HCT VFR BLD CALC: 30.4 % — LOW (ref 34.5–45)
HGB BLD-MCNC: 9.8 G/DL — LOW (ref 11.5–15.5)
MCHC RBC-ENTMCNC: 31 PG — SIGNIFICANT CHANGE UP (ref 27–34)
MCHC RBC-ENTMCNC: 32.2 GM/DL — SIGNIFICANT CHANGE UP (ref 32–36)
MCV RBC AUTO: 96.2 FL — SIGNIFICANT CHANGE UP (ref 80–100)
NRBC # BLD: 0 /100 WBCS — SIGNIFICANT CHANGE UP (ref 0–0)
PLATELET # BLD AUTO: 236 K/UL — SIGNIFICANT CHANGE UP (ref 150–400)
RBC # BLD: 3.16 M/UL — LOW (ref 3.8–5.2)
RBC # FLD: 12.7 % — SIGNIFICANT CHANGE UP (ref 10.3–14.5)
WBC # BLD: 11.65 K/UL — HIGH (ref 3.8–10.5)
WBC # FLD AUTO: 11.65 K/UL — HIGH (ref 3.8–10.5)

## 2018-12-21 RX ADMIN — Medication 1: at 12:15

## 2018-12-21 RX ADMIN — Medication 400 MILLIGRAM(S): at 18:05

## 2018-12-21 RX ADMIN — Medication 400 MILLIGRAM(S): at 08:02

## 2018-12-21 RX ADMIN — Medication 3000 UNIT(S): at 12:16

## 2018-12-21 RX ADMIN — MEMANTINE HYDROCHLORIDE 10 MILLIGRAM(S): 10 TABLET ORAL at 05:32

## 2018-12-21 RX ADMIN — MEMANTINE HYDROCHLORIDE 10 MILLIGRAM(S): 10 TABLET ORAL at 18:05

## 2018-12-21 RX ADMIN — Medication 5 MILLIGRAM(S): at 20:32

## 2018-12-21 RX ADMIN — Medication 1: at 08:02

## 2018-12-21 RX ADMIN — BUPROPION HYDROCHLORIDE 150 MILLIGRAM(S): 150 TABLET, EXTENDED RELEASE ORAL at 12:15

## 2018-12-21 RX ADMIN — MIRTAZAPINE 30 MILLIGRAM(S): 45 TABLET, ORALLY DISINTEGRATING ORAL at 20:33

## 2018-12-21 RX ADMIN — CEFTRIAXONE 1000 MILLIGRAM(S): 500 INJECTION, POWDER, FOR SOLUTION INTRAMUSCULAR; INTRAVENOUS at 05:32

## 2018-12-22 VITALS
HEART RATE: 55 BPM | OXYGEN SATURATION: 98 % | DIASTOLIC BLOOD PRESSURE: 38 MMHG | SYSTOLIC BLOOD PRESSURE: 134 MMHG | TEMPERATURE: 98 F | RESPIRATION RATE: 16 BRPM

## 2018-12-22 LAB
GLUCOSE BLDC GLUCOMTR-MCNC: 136 MG/DL — HIGH (ref 70–99)
GLUCOSE BLDC GLUCOMTR-MCNC: 146 MG/DL — HIGH (ref 70–99)

## 2018-12-22 RX ORDER — ACETAMINOPHEN 500 MG
2 TABLET ORAL
Qty: 0 | Refills: 0 | DISCHARGE
Start: 2018-12-22

## 2018-12-22 RX ORDER — OXYCODONE HYDROCHLORIDE 5 MG/1
1 TABLET ORAL
Qty: 0 | Refills: 0 | DISCHARGE
Start: 2018-12-22

## 2018-12-22 RX ADMIN — MEMANTINE HYDROCHLORIDE 10 MILLIGRAM(S): 10 TABLET ORAL at 06:44

## 2018-12-22 RX ADMIN — BUPROPION HYDROCHLORIDE 150 MILLIGRAM(S): 150 TABLET, EXTENDED RELEASE ORAL at 12:09

## 2018-12-22 RX ADMIN — Medication 400 MILLIGRAM(S): at 08:25

## 2018-12-22 RX ADMIN — CEFTRIAXONE 1000 MILLIGRAM(S): 500 INJECTION, POWDER, FOR SOLUTION INTRAMUSCULAR; INTRAVENOUS at 06:44

## 2018-12-22 NOTE — DISCHARGE NOTE ADULT - HOSPITAL COURSE
Hospital course:     89 y/o F with PMH of dementia, depression, HTN, spinal stenosis, DM2, p/w fall. Patient is a poor historian 2/2 dementia. Patient denies LOC, and only complaint is L hip pain. Found to have L acetabular fracture, and was evaluated by ortho at bedside. Ortho does not recommend surgical intervention at this time.      12/20/18: Patient seen and examined. No active issues.   12/21/18: Patient seen and examined. Sitting in a chair, confused  12/22/18: remains stable. No active issues.       Vital Signs Last 24 Hrs  T(C): 36.6 (22 Dec 2018 05:29), Max: 36.7 (21 Dec 2018 17:00)  T(F): 97.8 (22 Dec 2018 05:29), Max: 98 (21 Dec 2018 17:00)  HR: 54 (22 Dec 2018 05:29) (54 - 65)  BP: 137/62 (22 Dec 2018 05:29) (105/56 - 150/57)  BP(mean): --  RR: 16 (22 Dec 2018 05:29) (16 - 17)  SpO2: 94% (22 Dec 2018 05:29) (93% - 94%)    Physical Exam:      Heart: S1, S2 regular, no murmur  Lungs: clear, no rales  Abdomen: soft, nontender, bowel sound present  Ext: no edema  CNS: confused, moving all limbs      Assessment and Plan:   Assessment:  · Assessment		    89 y/o F with PMH of dementia, depression, HTN, spinal stenosis, DM2, p/w fall    *L acetablular fracture s/p fall + pubic ramus fracture   -Pain control  -Continue PT    *UTI w/ leukocytosis, resolving  Completed Rocephin    *Dehydration: resolved    *Anemia w/ L extraperitoneal hemorrhage along pelvic sidewall   -Trauma follow up appreciated    *Dementia / depression / HTN / spinal stenosis  -C/w home meds and if conditions remain stable during hospitalization -> f/u outpatient for further management     *8mm Pulmonary nodule on CT  -Outpatient follow up    *DM2  -Humalog ISS    Rehab today  PMD notified  Spent more than 30 min to prepare the discharge.

## 2018-12-22 NOTE — DISCHARGE NOTE ADULT - PATIENT PORTAL LINK FT
You can access the [facility] Patient Portal, offered by [facility], by registering with the following website: [email or web address]

## 2018-12-22 NOTE — DISCHARGE NOTE ADULT - CARE PLAN
Principal Discharge DX:	Closed displaced fracture of left acetabulum, unspecified portion of acetabulum, sequela  Goal:	prevent further fall  Assessment and plan of treatment:	Follow up with PMD  CT chest in 6 months to follow up pulmonary nodule

## 2018-12-27 DIAGNOSIS — F32.9 MAJOR DEPRESSIVE DISORDER, SINGLE EPISODE, UNSPECIFIED: ICD-10-CM

## 2018-12-27 DIAGNOSIS — S32.415A NONDISPLACED FRACTURE OF ANTERIOR WALL OF LEFT ACETABULUM, INITIAL ENCOUNTER FOR CLOSED FRACTURE: ICD-10-CM
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
## 2018-12-27 DIAGNOSIS — M48.00 SPINAL STENOSIS, SITE UNSPECIFIED: ICD-10-CM
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
## 2018-12-27 DIAGNOSIS — R58 HEMORRHAGE, NOT ELSEWHERE CLASSIFIED: ICD-10-CM

## 2018-12-27 DIAGNOSIS — E86.0 DEHYDRATION: ICD-10-CM

## 2018-12-27 DIAGNOSIS — E11.9 TYPE 2 DIABETES MELLITUS WITHOUT COMPLICATIONS: ICD-10-CM

## 2018-12-27 DIAGNOSIS — D64.9 ANEMIA, UNSPECIFIED: ICD-10-CM

## 2018-12-27 DIAGNOSIS — N39.0 URINARY TRACT INFECTION, SITE NOT SPECIFIED: ICD-10-CM

## 2018-12-27 DIAGNOSIS — F03.90 UNSPECIFIED DEMENTIA WITHOUT BEHAVIORAL DISTURBANCE: ICD-10-CM

## 2018-12-27 DIAGNOSIS — S32.509A UNSPECIFIED FRACTURE OF UNSPECIFIED PUBIS, INITIAL ENCOUNTER FOR CLOSED FRACTURE: ICD-10-CM

## 2018-12-27 DIAGNOSIS — I10 ESSENTIAL (PRIMARY) HYPERTENSION: ICD-10-CM

## 2018-12-27 DIAGNOSIS — R91.1 SOLITARY PULMONARY NODULE: ICD-10-CM

## 2019-03-17 NOTE — H&P ADULT - RESPIRATORY
Breath Sounds equal & clear to percussion & auscultation, no accessory muscle use PROBLEM 1) COPD exacerbation - for now hold Advair and Spiriva and use IV steroids and Duoneb, consult pulmonary                  2) REBECCA - BIPAP per patient reported settings                  3) Tobacco abuse counselling - says he can stop on his own without nicotine substitute                   4) HTN - monitor on current meds                   5) Chronic pain (see HPI) Per discussion with patient will order morphine 8 mg prn stating this will suffice in hospital                   6) hyperlipidemia - Zocor and Fenofibrate

## 2019-07-21 ENCOUNTER — EMERGENCY (EMERGENCY)
Facility: HOSPITAL | Age: 84
LOS: 0 days | Discharge: HOME | End: 2019-07-22
Attending: EMERGENCY MEDICINE | Admitting: EMERGENCY MEDICINE
Payer: MEDICARE

## 2019-07-21 VITALS
DIASTOLIC BLOOD PRESSURE: 41 MMHG | WEIGHT: 119.93 LBS | HEART RATE: 54 BPM | RESPIRATION RATE: 16 BRPM | SYSTOLIC BLOOD PRESSURE: 137 MMHG | TEMPERATURE: 98 F | OXYGEN SATURATION: 100 %

## 2019-07-21 DIAGNOSIS — S09.90XA UNSPECIFIED INJURY OF HEAD, INITIAL ENCOUNTER: ICD-10-CM
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
## 2019-07-21 DIAGNOSIS — E11.9 TYPE 2 DIABETES MELLITUS WITHOUT COMPLICATIONS: ICD-10-CM
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
## 2019-07-21 DIAGNOSIS — M50.30 OTHER CERVICAL DISC DEGENERATION, UNSPECIFIED CERVICAL REGION: ICD-10-CM

## 2019-07-21 DIAGNOSIS — I67.89 OTHER CEREBROVASCULAR DISEASE: ICD-10-CM

## 2019-07-21 DIAGNOSIS — R94.31 ABNORMAL ELECTROCARDIOGRAM [ECG] [EKG]: ICD-10-CM

## 2019-07-21 DIAGNOSIS — I10 ESSENTIAL (PRIMARY) HYPERTENSION: ICD-10-CM

## 2019-07-21 DIAGNOSIS — F03.90 UNSPECIFIED DEMENTIA WITHOUT BEHAVIORAL DISTURBANCE: ICD-10-CM

## 2019-07-21 DIAGNOSIS — M48.00 SPINAL STENOSIS, SITE UNSPECIFIED: ICD-10-CM

## 2019-07-21 DIAGNOSIS — Z79.84 LONG TERM (CURRENT) USE OF ORAL HYPOGLYCEMIC DRUGS: ICD-10-CM
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
## 2019-07-21 DIAGNOSIS — Z79.82 LONG TERM (CURRENT) USE OF ASPIRIN: ICD-10-CM

## 2019-07-21 PROCEDURE — 93010 ELECTROCARDIOGRAM REPORT: CPT

## 2019-07-21 PROCEDURE — 99284 EMERGENCY DEPT VISIT MOD MDM: CPT

## 2019-07-21 PROCEDURE — 72125 CT NECK SPINE W/O DYE: CPT | Mod: 26

## 2019-07-21 PROCEDURE — 70450 CT HEAD/BRAIN W/O DYE: CPT | Mod: 26

## 2019-07-21 NOTE — ED ADULT TRIAGE NOTE - CHIEF COMPLAINT QUOTE
Unwitnessed fall at NH. As per EMS patient was ambulating without her walker. -LOC, patient remembers falling. Abrasion noted between eyebrows. Denies any complaints. On ASA. Trauma alert called.

## 2019-07-22 VITALS
HEART RATE: 55 BPM | SYSTOLIC BLOOD PRESSURE: 146 MMHG | OXYGEN SATURATION: 97 % | DIASTOLIC BLOOD PRESSURE: 54 MMHG | TEMPERATURE: 98 F | RESPIRATION RATE: 18 BRPM

## 2019-07-22 LAB — GLUCOSE BLDC GLUCOMTR-MCNC: 165 MG/DL — HIGH (ref 70–99)

## 2019-07-22 PROCEDURE — 71045 X-RAY EXAM CHEST 1 VIEW: CPT | Mod: 26

## 2019-07-22 PROCEDURE — 72170 X-RAY EXAM OF PELVIS: CPT | Mod: 26

## 2019-07-22 NOTE — ED ADULT NURSE NOTE - NSIMPLEMENTINTERV_GEN_ALL_ED
Implemented All Fall with Harm Risk Interventions:  [place] to call system. Call bell, personal items and telephone within reach. Instruct patient to call for assistance. Room bathroom lighting operational. Non-slip footwear when patient is off stretcher. Physically safe environment: no spills, clutter or unnecessary equipment. Stretcher in lowest position, wheels locked, appropriate side rails in place. Provide visual cue, wrist band, yellow gown, etc. Monitor gait and stability. Monitor for mental status changes and reorient to person, place, and time. Review medications for side effects contributing to fall risk. Reinforce activity limits and safety measures with patient and family. Provide visual clues: red socks.

## 2019-07-22 NOTE — ED ADULT NURSE NOTE - CHPI ED NUR SYMPTOMS NEG
no bleeding/no loss of consciousness/no numbness/no weakness/no tingling/no fever/no vomiting/no deformity

## 2019-07-22 NOTE — ED PEDIATRIC NURSE REASSESSMENT NOTE - NS ED NURSE REASSESS COMMENT FT2
patient ambulatory with assistance. denies pain during ambulation. no other complaints. resting in bed comfortably. plan to discharge back to assisted living facility.

## 2019-07-22 NOTE — ED PROVIDER NOTE - OBJECTIVE STATEMENT
89 yo female with h/o dementia, s/p fall at NH.  Pt does not remember falling, but offers no complaints.

## 2019-07-22 NOTE — ED ADULT NURSE REASSESSMENT NOTE - NS ED NURSE REASSESS COMMENT FT1
patient resting in bed. sleeping, arousable to voice. no complaints on arousal. denies pain or discomfort. xrays completed. will continue to monitor.

## 2019-07-22 NOTE — ED PROVIDER NOTE - NSFOLLOWUPINSTRUCTIONS_ED_ALL_ED_FT
CT head and cervical spine results included  Xrays of the pelvis and chest show no acute abnormalities  Return to the ER for any further concerning symptoms

## 2019-07-31 VITALS
RESPIRATION RATE: 20 BRPM | TEMPERATURE: 98 F | WEIGHT: 125 LBS | HEIGHT: 65 IN | DIASTOLIC BLOOD PRESSURE: 54 MMHG | SYSTOLIC BLOOD PRESSURE: 148 MMHG | HEART RATE: 59 BPM | OXYGEN SATURATION: 90 %

## 2019-08-01 ENCOUNTER — INPATIENT (INPATIENT)
Facility: HOSPITAL | Age: 84
LOS: 0 days | Discharge: HOME | End: 2019-08-02
Attending: INTERNAL MEDICINE | Admitting: INTERNAL MEDICINE
Payer: MEDICARE

## 2019-08-01 DIAGNOSIS — F03.90 UNSPECIFIED DEMENTIA WITHOUT BEHAVIORAL DISTURBANCE: ICD-10-CM

## 2019-08-01 DIAGNOSIS — R09.02 HYPOXEMIA: ICD-10-CM

## 2019-08-01 DIAGNOSIS — R55 SYNCOPE AND COLLAPSE: ICD-10-CM

## 2019-08-01 DIAGNOSIS — E11.69 TYPE 2 DIABETES MELLITUS WITH OTHER SPECIFIED COMPLICATION: ICD-10-CM

## 2019-08-01 LAB
ADD ON TEST-SPECIMEN IN LAB: SIGNIFICANT CHANGE UP
ALBUMIN SERPL ELPH-MCNC: 3.7 G/DL — SIGNIFICANT CHANGE UP (ref 3.3–5)
ALP SERPL-CCNC: 100 U/L — SIGNIFICANT CHANGE UP (ref 40–120)
ALT FLD-CCNC: 26 U/L — SIGNIFICANT CHANGE UP (ref 12–78)
ANION GAP SERPL CALC-SCNC: 5 MMOL/L — SIGNIFICANT CHANGE UP (ref 5–17)
ANION GAP SERPL CALC-SCNC: 5 MMOL/L — SIGNIFICANT CHANGE UP (ref 5–17)
APTT BLD: 27 SEC — LOW (ref 27.5–36.3)
AST SERPL-CCNC: 20 U/L — SIGNIFICANT CHANGE UP (ref 15–37)
BASE EXCESS BLDA CALC-SCNC: 2.6 MMOL/L — HIGH (ref -2–2)
BASOPHILS # BLD AUTO: 0.02 K/UL — SIGNIFICANT CHANGE UP (ref 0–0.2)
BASOPHILS NFR BLD AUTO: 0.2 % — SIGNIFICANT CHANGE UP (ref 0–2)
BILIRUB SERPL-MCNC: 0.2 MG/DL — SIGNIFICANT CHANGE UP (ref 0.2–1.2)
BLOOD GAS COMMENTS ARTERIAL: SIGNIFICANT CHANGE UP
BUN SERPL-MCNC: 20 MG/DL — SIGNIFICANT CHANGE UP (ref 7–23)
BUN SERPL-MCNC: 21 MG/DL — SIGNIFICANT CHANGE UP (ref 7–23)
CALCIUM SERPL-MCNC: 9.6 MG/DL — SIGNIFICANT CHANGE UP (ref 8.5–10.1)
CALCIUM SERPL-MCNC: 9.9 MG/DL — SIGNIFICANT CHANGE UP (ref 8.5–10.1)
CHLORIDE SERPL-SCNC: 105 MMOL/L — SIGNIFICANT CHANGE UP (ref 96–108)
CHLORIDE SERPL-SCNC: 105 MMOL/L — SIGNIFICANT CHANGE UP (ref 96–108)
CO2 SERPL-SCNC: 29 MMOL/L — SIGNIFICANT CHANGE UP (ref 22–31)
CO2 SERPL-SCNC: 31 MMOL/L — SIGNIFICANT CHANGE UP (ref 22–31)
CREAT SERPL-MCNC: 1.12 MG/DL — SIGNIFICANT CHANGE UP (ref 0.5–1.3)
CREAT SERPL-MCNC: 1.15 MG/DL — SIGNIFICANT CHANGE UP (ref 0.5–1.3)
EOSINOPHIL # BLD AUTO: 0.07 K/UL — SIGNIFICANT CHANGE UP (ref 0–0.5)
EOSINOPHIL NFR BLD AUTO: 0.6 % — SIGNIFICANT CHANGE UP (ref 0–6)
GAS PNL BLDA: SIGNIFICANT CHANGE UP
GLUCOSE SERPL-MCNC: 164 MG/DL — HIGH (ref 70–99)
GLUCOSE SERPL-MCNC: 191 MG/DL — HIGH (ref 70–99)
HCO3 BLDA-SCNC: 26 MMOL/L — SIGNIFICANT CHANGE UP (ref 21–29)
HCT VFR BLD CALC: 34.9 % — SIGNIFICANT CHANGE UP (ref 34.5–45)
HCT VFR BLD CALC: 36.2 % — SIGNIFICANT CHANGE UP (ref 34.5–45)
HGB BLD-MCNC: 11.3 G/DL — LOW (ref 11.5–15.5)
HGB BLD-MCNC: 11.6 G/DL — SIGNIFICANT CHANGE UP (ref 11.5–15.5)
IMM GRANULOCYTES NFR BLD AUTO: 0.9 % — SIGNIFICANT CHANGE UP (ref 0–1.5)
INR BLD: 0.97 RATIO — SIGNIFICANT CHANGE UP (ref 0.88–1.16)
LACTATE SERPL-SCNC: 1.7 MMOL/L — SIGNIFICANT CHANGE UP (ref 0.7–2)
LYMPHOCYTES # BLD AUTO: 27.5 % — SIGNIFICANT CHANGE UP (ref 13–44)
LYMPHOCYTES # BLD AUTO: 3.06 K/UL — SIGNIFICANT CHANGE UP (ref 1–3.3)
MAGNESIUM SERPL-MCNC: 2.1 MG/DL — SIGNIFICANT CHANGE UP (ref 1.6–2.6)
MCHC RBC-ENTMCNC: 30.9 PG — SIGNIFICANT CHANGE UP (ref 27–34)
MCHC RBC-ENTMCNC: 31.4 PG — SIGNIFICANT CHANGE UP (ref 27–34)
MCHC RBC-ENTMCNC: 32 GM/DL — SIGNIFICANT CHANGE UP (ref 32–36)
MCHC RBC-ENTMCNC: 32.4 GM/DL — SIGNIFICANT CHANGE UP (ref 32–36)
MCV RBC AUTO: 96.5 FL — SIGNIFICANT CHANGE UP (ref 80–100)
MCV RBC AUTO: 96.9 FL — SIGNIFICANT CHANGE UP (ref 80–100)
MONOCYTES # BLD AUTO: 1.06 K/UL — HIGH (ref 0–0.9)
MONOCYTES NFR BLD AUTO: 9.5 % — SIGNIFICANT CHANGE UP (ref 2–14)
NEUTROPHILS # BLD AUTO: 6.82 K/UL — SIGNIFICANT CHANGE UP (ref 1.8–7.4)
NEUTROPHILS NFR BLD AUTO: 61.3 % — SIGNIFICANT CHANGE UP (ref 43–77)
PCO2 BLDA: 39 MMHG — SIGNIFICANT CHANGE UP (ref 32–46)
PH BLDA: 7.45 — SIGNIFICANT CHANGE UP (ref 7.35–7.45)
PLATELET # BLD AUTO: 165 K/UL — SIGNIFICANT CHANGE UP (ref 150–400)
PLATELET # BLD AUTO: 183 K/UL — SIGNIFICANT CHANGE UP (ref 150–400)
PO2 BLDA: 66 MMHG — LOW (ref 74–108)
POTASSIUM SERPL-MCNC: 4.4 MMOL/L — SIGNIFICANT CHANGE UP (ref 3.5–5.3)
POTASSIUM SERPL-MCNC: 4.7 MMOL/L — SIGNIFICANT CHANGE UP (ref 3.5–5.3)
POTASSIUM SERPL-SCNC: 4.4 MMOL/L — SIGNIFICANT CHANGE UP (ref 3.5–5.3)
POTASSIUM SERPL-SCNC: 4.7 MMOL/L — SIGNIFICANT CHANGE UP (ref 3.5–5.3)
PROT SERPL-MCNC: 7.6 GM/DL — SIGNIFICANT CHANGE UP (ref 6–8.3)
PROTHROM AB SERPL-ACNC: 10.8 SEC — SIGNIFICANT CHANGE UP (ref 10–12.9)
RAPID RVP RESULT: SIGNIFICANT CHANGE UP
RBC # BLD: 3.6 M/UL — LOW (ref 3.8–5.2)
RBC # BLD: 3.75 M/UL — LOW (ref 3.8–5.2)
RBC # FLD: 12.4 % — SIGNIFICANT CHANGE UP (ref 10.3–14.5)
RBC # FLD: 12.5 % — SIGNIFICANT CHANGE UP (ref 10.3–14.5)
SAO2 % BLDA: 89 % — LOW (ref 92–96)
SODIUM SERPL-SCNC: 139 MMOL/L — SIGNIFICANT CHANGE UP (ref 135–145)
SODIUM SERPL-SCNC: 141 MMOL/L — SIGNIFICANT CHANGE UP (ref 135–145)
TROPONIN I SERPL-MCNC: <0.015 NG/ML — SIGNIFICANT CHANGE UP (ref 0.01–0.04)
TROPONIN I SERPL-MCNC: <0.015 NG/ML — SIGNIFICANT CHANGE UP (ref 0.01–0.04)
WBC # BLD: 10.26 K/UL — SIGNIFICANT CHANGE UP (ref 3.8–10.5)
WBC # BLD: 11.13 K/UL — HIGH (ref 3.8–10.5)
WBC # FLD AUTO: 10.26 K/UL — SIGNIFICANT CHANGE UP (ref 3.8–10.5)
WBC # FLD AUTO: 11.13 K/UL — HIGH (ref 3.8–10.5)

## 2019-08-01 PROCEDURE — 71045 X-RAY EXAM CHEST 1 VIEW: CPT | Mod: 26

## 2019-08-01 PROCEDURE — 72125 CT NECK SPINE W/O DYE: CPT | Mod: 26

## 2019-08-01 PROCEDURE — 93010 ELECTROCARDIOGRAM REPORT: CPT

## 2019-08-01 PROCEDURE — 70450 CT HEAD/BRAIN W/O DYE: CPT | Mod: 26

## 2019-08-01 PROCEDURE — 74176 CT ABD & PELVIS W/O CONTRAST: CPT | Mod: 26

## 2019-08-01 PROCEDURE — 71275 CT ANGIOGRAPHY CHEST: CPT | Mod: 26

## 2019-08-01 PROCEDURE — 99285 EMERGENCY DEPT VISIT HI MDM: CPT

## 2019-08-01 PROCEDURE — 71250 CT THORAX DX C-: CPT | Mod: 26,59

## 2019-08-01 RX ORDER — ONDANSETRON 8 MG/1
4 TABLET, FILM COATED ORAL EVERY 6 HOURS
Refills: 0 | Status: DISCONTINUED | OUTPATIENT
Start: 2019-08-01 | End: 2019-08-02

## 2019-08-01 RX ORDER — ASPIRIN/CALCIUM CARB/MAGNESIUM 324 MG
325 TABLET ORAL ONCE
Refills: 0 | Status: COMPLETED | OUTPATIENT
Start: 2019-08-01 | End: 2019-08-01

## 2019-08-01 RX ORDER — ALBUTEROL 90 UG/1
2.5 AEROSOL, METERED ORAL EVERY 6 HOURS
Refills: 0 | Status: DISCONTINUED | OUTPATIENT
Start: 2019-08-01 | End: 2019-08-02

## 2019-08-01 RX ORDER — LANOLIN ALCOHOL/MO/W.PET/CERES
5 CREAM (GRAM) TOPICAL AT BEDTIME
Refills: 0 | Status: DISCONTINUED | OUTPATIENT
Start: 2019-08-01 | End: 2019-08-02

## 2019-08-01 RX ORDER — BUPROPION HYDROCHLORIDE 150 MG/1
150 TABLET, EXTENDED RELEASE ORAL DAILY
Refills: 0 | Status: DISCONTINUED | OUTPATIENT
Start: 2019-08-01 | End: 2019-08-02

## 2019-08-01 RX ORDER — INSULIN LISPRO 100/ML
VIAL (ML) SUBCUTANEOUS AT BEDTIME
Refills: 0 | Status: DISCONTINUED | OUTPATIENT
Start: 2019-08-01 | End: 2019-08-02

## 2019-08-01 RX ORDER — CHOLECALCIFEROL (VITAMIN D3) 125 MCG
1.5 CAPSULE ORAL
Qty: 0 | Refills: 0 | DISCHARGE

## 2019-08-01 RX ORDER — SENNA PLUS 8.6 MG/1
2 TABLET ORAL AT BEDTIME
Refills: 0 | Status: DISCONTINUED | OUTPATIENT
Start: 2019-08-01 | End: 2019-08-02

## 2019-08-01 RX ORDER — ACETAMINOPHEN 500 MG
650 TABLET ORAL ONCE
Refills: 0 | Status: COMPLETED | OUTPATIENT
Start: 2019-08-01 | End: 2019-08-01

## 2019-08-01 RX ORDER — OXYCODONE HYDROCHLORIDE 5 MG/1
5 TABLET ORAL ONCE
Refills: 0 | Status: DISCONTINUED | OUTPATIENT
Start: 2019-08-01 | End: 2019-08-01

## 2019-08-01 RX ORDER — INSULIN LISPRO 100/ML
VIAL (ML) SUBCUTANEOUS
Refills: 0 | Status: DISCONTINUED | OUTPATIENT
Start: 2019-08-01 | End: 2019-08-02

## 2019-08-01 RX ORDER — LIDOCAINE 4 G/100G
1 CREAM TOPICAL DAILY
Refills: 0 | Status: DISCONTINUED | OUTPATIENT
Start: 2019-08-01 | End: 2019-08-02

## 2019-08-01 RX ORDER — ENOXAPARIN SODIUM 100 MG/ML
40 INJECTION SUBCUTANEOUS DAILY
Refills: 0 | Status: DISCONTINUED | OUTPATIENT
Start: 2019-08-01 | End: 2019-08-02

## 2019-08-01 RX ORDER — GLUCAGON INJECTION, SOLUTION 0.5 MG/.1ML
1 INJECTION, SOLUTION SUBCUTANEOUS ONCE
Refills: 0 | Status: DISCONTINUED | OUTPATIENT
Start: 2019-08-01 | End: 2019-08-02

## 2019-08-01 RX ORDER — DEXTROSE 50 % IN WATER 50 %
25 SYRINGE (ML) INTRAVENOUS ONCE
Refills: 0 | Status: DISCONTINUED | OUTPATIENT
Start: 2019-08-01 | End: 2019-08-02

## 2019-08-01 RX ORDER — MEMANTINE HYDROCHLORIDE 10 MG/1
10 TABLET ORAL
Refills: 0 | Status: DISCONTINUED | OUTPATIENT
Start: 2019-08-01 | End: 2019-08-02

## 2019-08-01 RX ORDER — SODIUM CHLORIDE 9 MG/ML
1000 INJECTION, SOLUTION INTRAVENOUS
Refills: 0 | Status: DISCONTINUED | OUTPATIENT
Start: 2019-08-01 | End: 2019-08-02

## 2019-08-01 RX ORDER — ASPIRIN/CALCIUM CARB/MAGNESIUM 324 MG
81 TABLET ORAL DAILY
Refills: 0 | Status: DISCONTINUED | OUTPATIENT
Start: 2019-08-01 | End: 2019-08-02

## 2019-08-01 RX ORDER — DEXTROSE 50 % IN WATER 50 %
15 SYRINGE (ML) INTRAVENOUS ONCE
Refills: 0 | Status: DISCONTINUED | OUTPATIENT
Start: 2019-08-01 | End: 2019-08-02

## 2019-08-01 RX ORDER — DEXTROSE 50 % IN WATER 50 %
12.5 SYRINGE (ML) INTRAVENOUS ONCE
Refills: 0 | Status: DISCONTINUED | OUTPATIENT
Start: 2019-08-01 | End: 2019-08-02

## 2019-08-01 RX ORDER — ATORVASTATIN CALCIUM 80 MG/1
10 TABLET, FILM COATED ORAL AT BEDTIME
Refills: 0 | Status: DISCONTINUED | OUTPATIENT
Start: 2019-08-01 | End: 2019-08-02

## 2019-08-01 RX ORDER — DOCUSATE SODIUM 100 MG
100 CAPSULE ORAL THREE TIMES A DAY
Refills: 0 | Status: DISCONTINUED | OUTPATIENT
Start: 2019-08-01 | End: 2019-08-02

## 2019-08-01 RX ORDER — MIRTAZAPINE 45 MG/1
30 TABLET, ORALLY DISINTEGRATING ORAL AT BEDTIME
Refills: 0 | Status: DISCONTINUED | OUTPATIENT
Start: 2019-08-01 | End: 2019-08-02

## 2019-08-01 RX ADMIN — Medication 1 TABLET(S): at 12:58

## 2019-08-01 RX ADMIN — MEMANTINE HYDROCHLORIDE 10 MILLIGRAM(S): 10 TABLET ORAL at 15:03

## 2019-08-01 RX ADMIN — LIDOCAINE 1 PATCH: 4 CREAM TOPICAL at 17:46

## 2019-08-01 RX ADMIN — BUPROPION HYDROCHLORIDE 150 MILLIGRAM(S): 150 TABLET, EXTENDED RELEASE ORAL at 12:57

## 2019-08-01 RX ADMIN — Medication 81 MILLIGRAM(S): at 12:57

## 2019-08-01 RX ADMIN — Medication 100 MILLIGRAM(S): at 21:32

## 2019-08-01 RX ADMIN — ENOXAPARIN SODIUM 40 MILLIGRAM(S): 100 INJECTION SUBCUTANEOUS at 12:58

## 2019-08-01 RX ADMIN — OXYCODONE HYDROCHLORIDE 5 MILLIGRAM(S): 5 TABLET ORAL at 03:36

## 2019-08-01 RX ADMIN — OXYCODONE HYDROCHLORIDE 5 MILLIGRAM(S): 5 TABLET ORAL at 03:06

## 2019-08-01 RX ADMIN — MIRTAZAPINE 30 MILLIGRAM(S): 45 TABLET, ORALLY DISINTEGRATING ORAL at 21:32

## 2019-08-01 RX ADMIN — Medication 1: at 12:43

## 2019-08-01 RX ADMIN — Medication 650 MILLIGRAM(S): at 01:05

## 2019-08-01 RX ADMIN — Medication 325 MILLIGRAM(S): at 03:06

## 2019-08-01 RX ADMIN — Medication 650 MILLIGRAM(S): at 00:39

## 2019-08-01 RX ADMIN — ATORVASTATIN CALCIUM 10 MILLIGRAM(S): 80 TABLET, FILM COATED ORAL at 21:32

## 2019-08-01 RX ADMIN — LIDOCAINE 1 PATCH: 4 CREAM TOPICAL at 12:58

## 2019-08-01 RX ADMIN — Medication 5 MILLIGRAM(S): at 21:32

## 2019-08-01 RX ADMIN — Medication 100 MILLIGRAM(S): at 12:58

## 2019-08-01 RX ADMIN — Medication 100 MILLIGRAM(S): at 15:01

## 2019-08-01 NOTE — H&P ADULT - HISTORY OF PRESENT ILLNESS
89 year old woman with history of dementia, HTN, Spinal stenosis, T2DM, multiple falls in the past - in December with acetabular fracture- no surgery ans was discharged to rehab who presented to the ED s/p unwitnessed fall. Patient is a very poor historian- complaints of back pain, cannot recall event. As per her daughter she was recently on antibiotics for URI and UTI, +dry cough. In the ED patient was noted to be hypoxic and was placed on supplemental O2- CT chest negative for PE, Ddimer - 9202, 8mm right upper lobe pulmonary nodule- as per daughter- they are aware and has followed up as an outpatient. Cardiology consulted.     PMH- as above  PSH- denies  FMH- denies  Social- lives as assisted living facility  does not smoke or drink

## 2019-08-01 NOTE — PHYSICAL THERAPY INITIAL EVALUATION ADULT - PLANNED THERAPY INTERVENTIONS, PT EVAL
pain relieving modalities to lower back/gait training/balance training/bed mobility training/postural re-education/transfer training

## 2019-08-01 NOTE — H&P ADULT - NSHPPHYSICALEXAM_GEN_ALL_CORE
Vital Signs Last 24 Hrs  T(C): 36.9 (01 Aug 2019 06:49), Max: 36.9 (01 Aug 2019 06:30)  T(F): 98.4 (01 Aug 2019 06:49), Max: 98.5 (01 Aug 2019 06:30)  HR: 55 (01 Aug 2019 06:49) (55 - 62)  BP: 134/55 (01 Aug 2019 06:49) (129/59 - 168/53)  BP(mean): --  RR: 16 (01 Aug 2019 07:30) (16 - 22)  SpO2: 98% (01 Aug 2019 07:30) (88% - 98%)    PE:  Constitutional: NAD, laying in bed  HEENT: NC/AT  Back: no tenderness  Respiratory: respirations even and non labored, coarse breath sounds throughout lung fields  Cardiovascular: S1S2 regular, no murmurs  Abdomen: soft, not tender, not distended, positive BS  Genitourinary: voiding  Rectal: deferred  Musculoskeletal: no muscle tenderness, no joint swelling or tenderness  Extremities: no pedal edema   Neurological: no focal deficits

## 2019-08-01 NOTE — PHYSICAL THERAPY INITIAL EVALUATION ADULT - PERTINENT HX OF CURRENT PROBLEM, REHAB EVAL
unwitnessed fall in assisted living,likely NOT remembering to use RW consistently in apartment (ie-getting up at night to use bathroom )

## 2019-08-01 NOTE — PATIENT PROFILE ADULT - IF HCP IS NOT ON CHART, IDENTIFY THE PERSONS
IT IS ON CHART SEE PREVIOUS DOCUMENTATION STATING THAT HCP IS ON THE CHART NO FURTHER DOCUMENTATION SHOULD BE NEEDED

## 2019-08-01 NOTE — PHYSICAL THERAPY INITIAL EVALUATION ADULT - CRITERIA FOR SKILLED THERAPEUTIC INTERVENTIONS
anticipated discharge recommendation/anticipated equipment needs at discharge/risk reduction/prevention/rehab potential/predicted duration of therapy intervention/impairments found/functional limitations in following categories/therapy frequency

## 2019-08-01 NOTE — H&P ADULT - PROBLEM SELECTOR PLAN 4
supportive care  contienu namenda    VTE Improve - 1  Lovenox SQ  Case d/w Dr Harrell, Plan explained to daughter who was at the bedside- Stacey.

## 2019-08-01 NOTE — PHYSICAL THERAPY INITIAL EVALUATION ADULT - DISCHARGE DISPOSITION, PT EVAL
home w/ home PT/return to assisted living with increased supervision for safety,consider private [facility] part-time

## 2019-08-01 NOTE — ED PROVIDER NOTE - QRS
Citlaly Gustavomsmonica discharged to home accompanied by mother.   Patient provided with the following educational materials upon discharge:  Lap Nelida, tramadol.   Valuables and belongings sent with patient.   discharge summary, discharge instructions, medications and follow up appointments reviewed with patient and mother.  Patient and mother verbalized understanding   LBBB

## 2019-08-01 NOTE — CONSULT NOTE ADULT - SUBJECTIVE AND OBJECTIVE BOX
Patient is a 89y old  Female who presents with a chief complaint of s/p fall.       HPI:  89 year old woman with history of dementia, HTN, Spinal stenosis, T2DM, multiple falls in the past - in December with acetabular fracture- no surgery ans was discharged to rehab who presented to the ED s/p unwitnessed fall. Patient is a very poor historian- complaints of back pain, cannot recall event. As per her daughter who is at bedside this am when I saw the pt states  she was recently on antibiotics for URI and UTI, +dry cough. Pt had another recent fall and this one was the second one. Pt uses walker.   In the ED patient was noted to be hypoxic and was placed on supplemental O2- CT chest negative for PE, Ddimer - 9202, 8mm right upper lobe pulmonary nodule- as per daughter- they are aware and has followed up as an outpatient. Cardiology consulted.    Pt denies any CP or pressure or SOBor dizziness.   She or daughter denies any prior cardiac history.        Social- lives as assisted living facility  non smoker, no alcohol use       PAST MEDICAL & SURGICAL HISTORY:  Spinal stenosis  Diabetes  HTN (hypertension)  Dementia      MEDICATIONS  (STANDING):  aspirin enteric coated 81 milliGRAM(s) Oral daily  atorvastatin 10 milliGRAM(s) Oral at bedtime  benzonatate 100 milliGRAM(s) Oral three times a day  buPROPion XL . 150 milliGRAM(s) Oral daily  dextrose 5%. 1000 milliLiter(s) (50 mL/Hr) IV Continuous <Continuous>  dextrose 50% Injectable 12.5 Gram(s) IV Push once  dextrose 50% Injectable 25 Gram(s) IV Push once  dextrose 50% Injectable 25 Gram(s) IV Push once  docusate sodium 100 milliGRAM(s) Oral three times a day  enoxaparin Injectable 40 milliGRAM(s) SubCutaneous daily  insulin lispro (HumaLOG) corrective regimen sliding scale   SubCutaneous three times a day before meals  insulin lispro (HumaLOG) corrective regimen sliding scale   SubCutaneous at bedtime  lidocaine   Patch 1 Patch Transdermal daily  melatonin 5 milliGRAM(s) Oral at bedtime  memantine 10 milliGRAM(s) Oral two times a day  mirtazapine 30 milliGRAM(s) Oral at bedtime  multivitamin 1 Tablet(s) Oral daily    MEDICATIONS  (PRN):  ALBUTerol    0.083% 2.5 milliGRAM(s) Nebulizer every 6 hours PRN Shortness of Breath and/or Wheezing  dextrose 40% Gel 15 Gram(s) Oral once PRN Blood Glucose LESS THAN 70 milliGRAM(s)/deciliter  glucagon  Injectable 1 milliGRAM(s) IntraMuscular once PRN Glucose LESS THAN 70 milligrams/deciliter  ondansetron Injectable 4 milliGRAM(s) IV Push every 6 hours PRN Nausea  senna 2 Tablet(s) Oral at bedtime PRN Constipation      FAMILY HISTORY:  No pertinent family history in first degree relatives      REVIEW OF SYSTEMS:  CONSTITUTIONAL:    No fatigue, malaise, lethargy.  No fever or chills.  HEENT:  Eyes:  No visual changes.     ENT:  No epistaxis.  No sinus pain.    RESPIRATORY:  No cough.  No wheeze.  No hemoptysis.  No shortness of breath.  CARDIOVASCULAR:  No chest pains.  No palpitations. No shortness of breath, No orthopnea or PND.  GASTROINTESTINAL:  No abdominal pain.  No nausea or vomiting.    GENITOURINARY:    No hematuria.    MUSCULOSKELETAL:  c/o back pain   NEUROLOGICAL:  No tingling or numbness or weakness.  PSYCHIATRIC:  No confusion  SKIN:  No rashes.    ENDOCRINE:  No unexplained weight loss.  No polydipsia.   HEMATOLOGIC:  No anemia.  No prolonged or excessive bleeding.   ALLERGIC AND IMMUNOLOGIC:  No pruritus.          Vital Signs Last 24 Hrs  T(C): 36.6 (01 Aug 2019 11:34), Max: 36.9 (01 Aug 2019 06:30)  T(F): 97.8 (01 Aug 2019 11:34), Max: 98.5 (01 Aug 2019 06:30)  HR: 62 (01 Aug 2019 11:34) (55 - 62)  BP: 134/58 (01 Aug 2019 11:34) (129/59 - 168/53)  BP(mean): --  RR: 18 (01 Aug 2019 11:34) (16 - 22)  SpO2: 94% (01 Aug 2019 11:34) (88% - 98%)    PHYSICAL EXAM-    Constitutional:  elderly frail female in no acute distress, hard of hearing     Head: Head is normocephalic and atraumatic.      Neck: No jugular venous distention. No audible carotid bruits. There are strong carotid pulses bilaterally. No JVD.     Cardiovascular: Regular rate and rhythm without S3, S4. No murmurs or rubs are appreciated.      Respiratory: Breathsounds are normal. No rales. No wheezing.    Abdomen: Soft, nontender, nondistended with positive bowel sounds.      Extremity: No tenderness. No  pitting edema     Neurologic: The patient is alert and oriented.      Skin: No rash, no obvious lesions noted.      Psychiatric: The patient appears to be emotionally stable.      INTERPRETATION OF TELEMETRY: SR     ECG: Sinus bradycardia, L axis, LBBB    I&O's Detail      LABS:                        11.3   10.26 )-----------( 165      ( 01 Aug 2019 11:52 )             34.9     08-01    139  |  105  |  20  ----------------------------<  191<H>  4.7   |  29  |  1.12    Ca    9.6      01 Aug 2019 11:52  Mg     2.1     08-01    TPro  7.6  /  Alb  3.7  /  TBili  0.2  /  DBili  x   /  AST  20  /  ALT  26  /  AlkPhos  100  08-01    CARDIAC MARKERS ( 01 Aug 2019 02:46 )  <0.015 ng/mL / x     / x     / x     / x      CARDIAC MARKERS ( 01 Aug 2019 00:11 )  <0.015 ng/mL / x     / x     / x     / x          PT/INR - ( 01 Aug 2019 00:11 )   PT: 10.8 sec;   INR: 0.97 ratio         PTT - ( 01 Aug 2019 00:11 )  PTT:27.0 sec    I&O's Summary    BNP  RADIOLOGY & ADDITIONAL STUDIES:  < from: CT Angio Chest PE Protocol w/ IV Cont (08.01.19 @ 04:11) >    EXAM:  CTA CHEST PE PROTOCOL (W)AW IC                            PROCEDURE DATE:  08/01/2019          INTERPRETATION:  CLINICAL INFORMATION: Dyspnea    COMPARISON: Same day earlier    PROCEDURE:   CT Angiography of the Chest.  90 ml of Omnipaque 350 was injected intravenously. 10 ml were discarded.  Sagittal and coronal reformats were performed as well as 3D (MIP)   reconstructions.      FINDINGS:    LUNGS AND AIRWAYS: Patent central airways.  Dependent atelectasis. 8 mm   nodule in the right upper lobe without interval change.    PLEURA: No pleural effusion.    MEDIASTINUM AND DEA: No lymphadenopathy.    VESSELS: No pulmonary thromboembolism. Moderate atherosclerotic   calcification.    HEART: Heart size is normal. No pericardial effusion.    CHEST WALL AND LOWER NECK: Within normal limits.    VISUALIZED UPPER ABDOMEN: 3 cm sized left renal cyst.    BONES: Old compression of T11 and T12.    IMPRESSION:     No pulmonary thromboembolism.                    DANDRE QUINTANA   This document has been electronically signed. Aug  1 2019  4:30AM        < end of copied text >  < from: CT Chest No Cont (08.01.19 @ 00:37) >  EXAM:  CT ABDOMEN AND PELVIS                          EXAM:  CT CHEST                            PROCEDURE DATE:  08/01/2019          INTERPRETATION:  CLINICAL INFORMATION: Status post fall    COMPARISON: Comparison is made to the images of the lung apices on a   cervical spine CT of 12/19/2018    PROCEDURE:   CT of the Chest, Abdomen and Pelvis was performed without intravenous   contrast.   Intravenous contrast: None.  Oral contrast: None.  Sagittal and coronal reformats were performed.    FINDINGS: Study limited by lack of IV contrast.    CHEST:     LUNGS AND LARGE AIRWAYS: Patent central airways. Stable 8 mm right apical   pulmonary nodule..  PLEURA: No pneumothorax or pleural effusion.  VESSELS: Within normal limits.  HEART: Heart sizeis normal.No pericardial effusion.  MEDIASTINUM AND DEA: No hematoma or lymphadenopathy. Calcified   mediastinal lymph nodes.  CHEST WALL AND LOWER NECK: Left thyroid lobe coarse calcification..    ABDOMEN AND PELVIS:    LIVER: Within normal limits.  BILE DUCTS: Normal caliber.  GALLBLADDER: Within normal limits.  SPLEEN: Within normal limits.  PANCREAS: Within normal limits.  ADRENALS: Within normal limits.  KIDNEYS/URETERS: 3 cm left upper pole renal cyst..    BLADDER: Within normal limits.  REPRODUCTIVE ORGANS: The uterus and adnexa are within normal limits.    BOWEL: No bowel obstruction. Appendix normal. Diverticulosis coli.  PERITONEUM: No ascites.  VESSELS:  Within normal limits.  RETROPERITONEUM: No lymphadenopathy.    ABDOMINAL WALL: Within normal limits.  BONES: Old healed left inferior and superior pubic rami fractures. No   acute fractures. Degenerative changes of the spine. Mild chronic   appearing compression deformities of T11-L1 and L4.    IMPRESSION: No evidence for thoracic or abdominal trauma within the   limits of this noncontrast study.    Stable 8 mm right apical pulmonary nodule.              < end of copied text >

## 2019-08-01 NOTE — CONSULT NOTE ADULT - ASSESSMENT
1. Unwitnessed fall- per daughter pt had a mechanical fall recently other than the present one.  SO fart no arrythmias on tele.  She appears euvolemic on exa.  Check orthostatic BP readings.  Check 2 D echo.  Pt evaluation.    2.HTN- BP seems optimal .    3. Hyperlipidemia- continue atorvastatin.     Discussed with daughter the cardiac plan of care at length.

## 2019-08-01 NOTE — H&P ADULT - ASSESSMENT
89 year old woman with history of dementia, HTN, Spinal stenosis, T2DM, multiple falls in the past - in December with acetabular fracture- no surgery ans was discharged to rehab who presented to the ED s/p unwitnessed fall. Patient is a very poor historian- complaints of back pain, cannot recall event. As per her daughter she was recently on antibiotics for URI and UTI, +dry cough. In the ED patient was noted to be hypoxic and was placed on supplemental O2- CT chest negative for PE, Ddimer - 9202, 8mm right upper lobe pulmonary nodule- as per daughter- they are aware and has followed up as an outpatient. Cardiology consulted.

## 2019-08-01 NOTE — PHYSICAL THERAPY INITIAL EVALUATION ADULT - PATIENT/FAMILY/SIGNIFICANT OTHER GOALS STATEMENT, PT EVAL
pts daughter hopes patient can return to [facility] ,has had private aide at night in past after acetabular fx

## 2019-08-01 NOTE — ED PROVIDER NOTE - OBJECTIVE STATEMENT
89 yo female s/p unwitnessed fall.  Patient is demented and offers no history of the events.  Patient complains of low back pain.

## 2019-08-01 NOTE — ED PROVIDER NOTE - MUSCULOSKELETAL, MLM
Spine appears normal, range of motion is not limited, Diffuse tenderness across lower back without specific point tenderness.

## 2019-08-01 NOTE — H&P ADULT - ATTENDING COMMENTS
Hospitalist Update Note    Patient seen and examined, case discussed in depth with ALTAGRACIA Thurman and agree with H&P in addition to the following:     Pleasant 89 y.o F PMH of dementia and prior falls admitted for unwitnessed fall. Patient last had large traumatic fall 2018 resulting in hip fracture and since then intermittently falls. Family reports she sometimes ambulates without her walker. Has chronic dry cough for the past 2 weeks and did complete outpatient abx for suspected pneumonia.   Seen with daughter at bedside, eating breakfast. No complaints at present except for chronic low back pain.     VS reviewed.   Gen; awake and alert, pleasant in NAD, A&O  CV: +S1, S2, RRR  Resp: normal effort with bronchial breath sounds, no wheezing or rales.   Abd: soft/ NT/ND, +BS    Labs/ images reviewed.     A&P:     # Unwitnessed Fall - likely mechanical in nature however will monitor on telemetry for any arrhthymias.   - PT consult, fall precautions  - appreciate cardiology input.     # Hypoxia - resolved, no signs of CHF or infection. Hospitalist Update Note    Patient seen and examined, case discussed in depth with ALTAGRACIA Thurman and agree with H&P in addition to the following:     Pleasant 89 y.o F PMH of dementia and prior falls admitted for unwitnessed fall. Patient last had large traumatic fall 2018 resulting in hip fracture and since then intermittently falls. Family reports she sometimes ambulates without her walker. Has chronic dry cough for the past 2 weeks and did complete outpatient abx for suspected pneumonia.   Seen with daughter at bedside, eating breakfast. No complaints at present except for chronic low back pain.     VS reviewed.   Gen; awake and alert, pleasant in NAD, A&O  CV: +S1, S2, RRR  Resp: normal effort with bronchial breath sounds, no wheezing or rales.   Abd: soft/ NT/ND, +BS    Labs/ images reviewed.     A&P:     # Unwitnessed Fall - likely mechanical in nature however will monitor on telemetry for any arrhthymias.   - PT consult, fall precautions  - appreciate cardiology input.     # Hypoxia - resolved, no signs of CHF or infection. Off O2 and satting well on room air.   - no PE on CTA chest.   - hx of RUL pulm nodule, followed outpatient.     Code Status: DNR/DNI, reviewed with daughter, PEDRO signed.   Rest per NP.   Remain on telemetry, dc planning back to [place] in the next 48hrs.

## 2019-08-01 NOTE — H&P ADULT - NSHPLABSRESULTS_GEN_ALL_CORE
< from: CT Angio Chest PE Protocol w/ IV Cont (08.01.19 @ 04:11) >    EXAM:  CTA CHEST PE PROTOCOL (W)AW IC                       PROCEDURE DATE:  08/01/2019    INTERPRETATION:  CLINICAL INFORMATION: Dyspnea  COMPARISON: Same day earlier    PROCEDURE:   CT Angiography of the Chest.  90 ml of Omnipaque 350 was injected intravenously. 10 ml were discarded.  Sagittal and coronal reformats were performed as well as 3D (MIP)   reconstructions.    FINDINGS:  LUNGS AND AIRWAYS: Patent central airways.  Dependent atelectasis. 8 mm   nodule in the right upper lobe without interval change.  PLEURA: No pleural effusion.  MEDIASTINUM AND DEA: No lymphadenopathy.  VESSELS: No pulmonary thromboembolism. Moderate atherosclerotic   calcification.  HEART: Heart size is normal. No pericardial effusion.  CHEST WALL AND LOWER NECK: Within normal limits.  VISUALIZED UPPER ABDOMEN: 3 cm sized left renal cyst.  BONES: Old compression of T11 and T12.  IMPRESSION:   No pulmonary thromboembolism.      CT Chest No Cont (08.01.19 @ 00:37) >    EXAM:  CT ABDOMEN AND PELVIS                       EXAM:  CT CHEST                        PROCEDURE DATE:  08/01/2019   INTERPRETATION:  CLINICAL INFORMATION: Status post fall  COMPARISON: Comparison is made to the images of the lung apices on a   cervical spine CT of 12/19/2018  PROCEDURE:   CT of the Chest, Abdomen and Pelvis was performed without intravenous   contrast.   Intravenous contrast: None.  Oral contrast: None.  Sagittal and coronal reformats were performed.  FINDINGS: Study limited by lack of IV contrast.    CHEST:   LUNGS AND LARGE AIRWAYS: Patent central airways. Stable 8 mm right apical   pulmonary nodule..  PLEURA: No pneumothorax or pleural effusion.  VESSELS: Within normal limits.  HEART: Heart sizeis normal.No pericardial effusion.  MEDIASTINUM AND DEA: No hematoma or lymphadenopathy. Calcified   mediastinal lymph nodes.  CHEST WALL AND LOWER NECK: Left thyroid lobe coarse calcification..  ABDOMEN AND PELVIS:  LIVER: Within normal limits.  BILE DUCTS: Normal caliber.  GALLBLADDER: Within normal limits.  SPLEEN: Within normal limits.  PANCREAS: Within normal limits.  ADRENALS: Within normal limits.  KIDNEYS/URETERS: 3 cm left upper pole renal cyst..  BLADDER: Within normal limits.  REPRODUCTIVE ORGANS: The uterus and adnexa are within normal limits.  BOWEL: No bowel obstruction. Appendix normal. Diverticulosis coli.  PERITONEUM: No ascites.  VESSELS:  Within normal limits.  RETROPERITONEUM: No lymphadenopathy.    ABDOMINAL WALL: Within normal limits.  BONES: Old healed left inferior and superior pubic rami fractures. No   acute fractures. Degenerative changes of the spine. Mild chronic   appearing compression deformities of T11-L1 and L4.    IMPRESSION: No evidence for thoracic or abdominal trauma within the   limits of this noncontrast study.    Stable 8 mm right apical pulmonary nodule.

## 2019-08-01 NOTE — PHYSICAL THERAPY INITIAL EVALUATION ADULT - IMPAIRMENTS FOUND, PT EVAL
arousal, attention, and cognition/muscle strength/gait, locomotion, and balance/poor safety awareness/posture/cognitive impairment

## 2019-08-01 NOTE — ED ADULT NURSE REASSESSMENT NOTE - NS ED NURSE REASSESS COMMENT FT1
Pt received from previous RN.  Pt aaox self.  No c/o pain or discomfort, in no apparent distress.  Call bell given to patient.  Linens changed, pt clean and dry. Pt turned and repositioned. No additional needs. Will continue hourly rounding.
pt with appropriate O2 saturation, reliable waveform pleth on room air. no signs of any distress. will continue O2 sat monitoring and frequent rounding.
report received from previous rn. color good, skin warm and dry, respirations even and unlabored. patient resting, comfortable in appearance. patient repositioned. will continue to monitor.
will continue to monitor.

## 2019-08-01 NOTE — PHYSICAL THERAPY INITIAL EVALUATION ADULT - PATIENT PROFILE REVIEW, REHAB EVAL
resident of [facility] with 2 recent falls,one ~10 days ago and another preceding this admission (unwitnessed ) occurring in her private bedroom/yes

## 2019-08-02 VITALS
SYSTOLIC BLOOD PRESSURE: 150 MMHG | OXYGEN SATURATION: 99 % | TEMPERATURE: 98 F | DIASTOLIC BLOOD PRESSURE: 47 MMHG | HEART RATE: 61 BPM | RESPIRATION RATE: 17 BRPM

## 2019-08-02 LAB
ANION GAP SERPL CALC-SCNC: 5 MMOL/L — SIGNIFICANT CHANGE UP (ref 5–17)
APPEARANCE UR: CLEAR — SIGNIFICANT CHANGE UP
BASOPHILS # BLD AUTO: 0.02 K/UL — SIGNIFICANT CHANGE UP (ref 0–0.2)
BASOPHILS NFR BLD AUTO: 0.2 % — SIGNIFICANT CHANGE UP (ref 0–2)
BILIRUB UR-MCNC: NEGATIVE — SIGNIFICANT CHANGE UP
BUN SERPL-MCNC: 25 MG/DL — HIGH (ref 7–23)
CALCIUM SERPL-MCNC: 9.2 MG/DL — SIGNIFICANT CHANGE UP (ref 8.5–10.1)
CHLORIDE SERPL-SCNC: 103 MMOL/L — SIGNIFICANT CHANGE UP (ref 96–108)
CO2 SERPL-SCNC: 31 MMOL/L — SIGNIFICANT CHANGE UP (ref 22–31)
COLOR SPEC: YELLOW — SIGNIFICANT CHANGE UP
CREAT SERPL-MCNC: 1.26 MG/DL — SIGNIFICANT CHANGE UP (ref 0.5–1.3)
DIFF PNL FLD: ABNORMAL
EOSINOPHIL # BLD AUTO: 0.21 K/UL — SIGNIFICANT CHANGE UP (ref 0–0.5)
EOSINOPHIL NFR BLD AUTO: 1.9 % — SIGNIFICANT CHANGE UP (ref 0–6)
GLUCOSE SERPL-MCNC: 194 MG/DL — HIGH (ref 70–99)
GLUCOSE UR QL: NEGATIVE MG/DL — SIGNIFICANT CHANGE UP
HBA1C BLD-MCNC: 8.4 % — HIGH (ref 4–5.6)
HCT VFR BLD CALC: 31.7 % — LOW (ref 34.5–45)
HGB BLD-MCNC: 10.5 G/DL — LOW (ref 11.5–15.5)
IMM GRANULOCYTES NFR BLD AUTO: 0.6 % — SIGNIFICANT CHANGE UP (ref 0–1.5)
KETONES UR-MCNC: NEGATIVE — SIGNIFICANT CHANGE UP
LEUKOCYTE ESTERASE UR-ACNC: ABNORMAL
LYMPHOCYTES # BLD AUTO: 2.27 K/UL — SIGNIFICANT CHANGE UP (ref 1–3.3)
LYMPHOCYTES # BLD AUTO: 20.2 % — SIGNIFICANT CHANGE UP (ref 13–44)
MCHC RBC-ENTMCNC: 31 PG — SIGNIFICANT CHANGE UP (ref 27–34)
MCHC RBC-ENTMCNC: 33.1 GM/DL — SIGNIFICANT CHANGE UP (ref 32–36)
MCV RBC AUTO: 93.5 FL — SIGNIFICANT CHANGE UP (ref 80–100)
MONOCYTES # BLD AUTO: 1.39 K/UL — HIGH (ref 0–0.9)
MONOCYTES NFR BLD AUTO: 12.4 % — SIGNIFICANT CHANGE UP (ref 2–14)
NEUTROPHILS # BLD AUTO: 7.25 K/UL — SIGNIFICANT CHANGE UP (ref 1.8–7.4)
NEUTROPHILS NFR BLD AUTO: 64.7 % — SIGNIFICANT CHANGE UP (ref 43–77)
NITRITE UR-MCNC: NEGATIVE — SIGNIFICANT CHANGE UP
PH UR: 6.5 — SIGNIFICANT CHANGE UP (ref 5–8)
PLATELET # BLD AUTO: 147 K/UL — LOW (ref 150–400)
POTASSIUM SERPL-MCNC: 4.6 MMOL/L — SIGNIFICANT CHANGE UP (ref 3.5–5.3)
POTASSIUM SERPL-SCNC: 4.6 MMOL/L — SIGNIFICANT CHANGE UP (ref 3.5–5.3)
PROT UR-MCNC: 30 MG/DL
RBC # BLD: 3.39 M/UL — LOW (ref 3.8–5.2)
RBC # FLD: 12.4 % — SIGNIFICANT CHANGE UP (ref 10.3–14.5)
SODIUM SERPL-SCNC: 139 MMOL/L — SIGNIFICANT CHANGE UP (ref 135–145)
SP GR SPEC: 1.01 — SIGNIFICANT CHANGE UP (ref 1.01–1.02)
UROBILINOGEN FLD QL: NEGATIVE MG/DL — SIGNIFICANT CHANGE UP
WBC # BLD: 11.21 K/UL — HIGH (ref 3.8–10.5)
WBC # FLD AUTO: 11.21 K/UL — HIGH (ref 3.8–10.5)

## 2019-08-02 PROCEDURE — 93306 TTE W/DOPPLER COMPLETE: CPT | Mod: 26

## 2019-08-02 RX ORDER — LIDOCAINE 4 G/100G
1 CREAM TOPICAL
Qty: 7 | Refills: 0
Start: 2019-08-02

## 2019-08-02 RX ORDER — METFORMIN HYDROCHLORIDE 850 MG/1
1 TABLET ORAL
Qty: 0 | Refills: 0 | DISCHARGE

## 2019-08-02 RX ORDER — CEFTRIAXONE 500 MG/1
1000 INJECTION, POWDER, FOR SOLUTION INTRAMUSCULAR; INTRAVENOUS EVERY 24 HOURS
Refills: 0 | Status: DISCONTINUED | OUTPATIENT
Start: 2019-08-02 | End: 2019-08-02

## 2019-08-02 RX ORDER — CEFUROXIME AXETIL 250 MG
1 TABLET ORAL
Qty: 8 | Refills: 0
Start: 2019-08-02 | End: 2019-08-05

## 2019-08-02 RX ADMIN — Medication 100 MILLIGRAM(S): at 05:15

## 2019-08-02 RX ADMIN — BUPROPION HYDROCHLORIDE 150 MILLIGRAM(S): 150 TABLET, EXTENDED RELEASE ORAL at 11:58

## 2019-08-02 RX ADMIN — LIDOCAINE 1 PATCH: 4 CREAM TOPICAL at 11:58

## 2019-08-02 RX ADMIN — ENOXAPARIN SODIUM 40 MILLIGRAM(S): 100 INJECTION SUBCUTANEOUS at 11:58

## 2019-08-02 RX ADMIN — Medication 1 TABLET(S): at 11:59

## 2019-08-02 RX ADMIN — Medication 81 MILLIGRAM(S): at 11:58

## 2019-08-02 RX ADMIN — Medication 1: at 08:39

## 2019-08-02 RX ADMIN — Medication 2: at 17:16

## 2019-08-02 RX ADMIN — Medication 100 MILLIGRAM(S): at 12:05

## 2019-08-02 RX ADMIN — LIDOCAINE 1 PATCH: 4 CREAM TOPICAL at 00:09

## 2019-08-02 RX ADMIN — MEMANTINE HYDROCHLORIDE 10 MILLIGRAM(S): 10 TABLET ORAL at 05:15

## 2019-08-02 RX ADMIN — Medication 1: at 12:33

## 2019-08-02 RX ADMIN — CEFTRIAXONE 1000 MILLIGRAM(S): 500 INJECTION, POWDER, FOR SOLUTION INTRAMUSCULAR; INTRAVENOUS at 14:43

## 2019-08-02 NOTE — DISCHARGE NOTE PROVIDER - CARE PROVIDER_API CALL
Deon Mcguire (MD)  Family Medicine  [address], [address]  [place] [address]  Phone: [phone]  Fax: [phone]  Follow Up Time:

## 2019-08-02 NOTE — DISCHARGE NOTE NURSING/CASE MANAGEMENT/SOCIAL WORK - NSDCCRNAME_GEN_ALL_CORE_FT
[facility]([phone]); outpatient Physical Therapy to be reinstated by [facility](prescription provided)

## 2019-08-02 NOTE — PROGRESS NOTE ADULT - ASSESSMENT
1. Unwitnessed fall- per daughter pt had a mechanical fall recently other than the present one.  SO far no arrythmias on tele.  She appears euvolemic on exam.  No evidence of orthostatic per BP readings   Check 2 D echo.  Pt evaluation.    2.HTN- BP seems optimal .    3. Hyperlipidemia- continue atorvastatin.     Discussed with daughter the cardiac plan of care at length.

## 2019-08-02 NOTE — DISCHARGE NOTE NURSING/CASE MANAGEMENT/SOCIAL WORK - NSDCDPATPORTLINK_GEN_ALL_CORE
You can access the [facility] Patient Portal, offered by [facility], by registering with the following website: [email or web address]

## 2019-08-02 NOTE — PROGRESS NOTE ADULT - SUBJECTIVE AND OBJECTIVE BOX
Patient is a 89y old  Female who presents with a chief complaint of s/p fall.       HPI:  89 year old woman with history of dementia, HTN, Spinal stenosis, T2DM, multiple falls in the past - in December with acetabular fracture- no surgery ans was discharged to rehab who presented to the ED s/p unwitnessed fall. Patient is a very poor historian- complaints of back pain, cannot recall event. As per her daughter who is at bedside this am when I saw the pt states  she was recently on antibiotics for URI and UTI, +dry cough. Pt had another recent fall and this one was the second one. Pt uses walker.   In the ED patient was noted to be hypoxic and was placed on supplemental O2- CT chest negative for PE, Ddimer - 9202, 8mm right upper lobe pulmonary nodule- as per daughter- they are aware and has followed up as an outpatient. Cardiology consulted.  8/1-   Pt denies any CP or pressure or SOBor dizziness.   She or daughter denies any prior cardiac history.    8/2- pt seen and examined by me today am. Pt denies any CP or SOB this am. Sleeping but arousable.       Social- lives as assisted living facility  non smoker, no alcohol use       PAST MEDICAL & SURGICAL HISTORY:  Spinal stenosis  Diabetes  HTN (hypertension)  Dementia      MEDICATIONS  (STANDING):  aspirin enteric coated 81 milliGRAM(s) Oral daily  atorvastatin 10 milliGRAM(s) Oral at bedtime  benzonatate 100 milliGRAM(s) Oral three times a day  buPROPion XL . 150 milliGRAM(s) Oral daily  dextrose 5%. 1000 milliLiter(s) (50 mL/Hr) IV Continuous <Continuous>  dextrose 50% Injectable 12.5 Gram(s) IV Push once  dextrose 50% Injectable 25 Gram(s) IV Push once  dextrose 50% Injectable 25 Gram(s) IV Push once  docusate sodium 100 milliGRAM(s) Oral three times a day  enoxaparin Injectable 40 milliGRAM(s) SubCutaneous daily  insulin lispro (HumaLOG) corrective regimen sliding scale   SubCutaneous three times a day before meals  insulin lispro (HumaLOG) corrective regimen sliding scale   SubCutaneous at bedtime  lidocaine   Patch 1 Patch Transdermal daily  melatonin 5 milliGRAM(s) Oral at bedtime  memantine 10 milliGRAM(s) Oral two times a day  mirtazapine 30 milliGRAM(s) Oral at bedtime  multivitamin 1 Tablet(s) Oral daily    MEDICATIONS  (PRN):  ALBUTerol    0.083% 2.5 milliGRAM(s) Nebulizer every 6 hours PRN Shortness of Breath and/or Wheezing  dextrose 40% Gel 15 Gram(s) Oral once PRN Blood Glucose LESS THAN 70 milliGRAM(s)/deciliter  glucagon  Injectable 1 milliGRAM(s) IntraMuscular once PRN Glucose LESS THAN 70 milligrams/deciliter  ondansetron Injectable 4 milliGRAM(s) IV Push every 6 hours PRN Nausea  senna 2 Tablet(s) Oral at bedtime PRN Constipation      FAMILY HISTORY:  No pertinent family history in first degree relatives      REVIEW OF SYSTEMS:  CONSTITUTIONAL:    No fatigue, malaise, lethargy.  No fever or chills.  HEENT:  Eyes:  No visual changes.     ENT:  No epistaxis.  No sinus pain.    RESPIRATORY:  No cough.  No wheeze.  No hemoptysis.  No shortness of breath.  CARDIOVASCULAR:  No chest pains.  No palpitations. No shortness of breath, No orthopnea or PND.  GASTROINTESTINAL:  No abdominal pain.  No nausea or vomiting.    GENITOURINARY:    No hematuria.    MUSCULOSKELETAL:  c/o back pain   NEUROLOGICAL:  No tingling or numbness or weakness.  PSYCHIATRIC:  No confusion  SKIN:  No rashes.    ENDOCRINE:  No unexplained weight loss.  No polydipsia.   HEMATOLOGIC:  No anemia.  No prolonged or excessive bleeding.   ALLERGIC AND IMMUNOLOGIC:  No pruritus.          ICU Vital Signs Last 24 Hrs  T(C): 37.1 (02 Aug 2019 10:54), Max: 37.1 (02 Aug 2019 10:54)  T(F): 98.8 (02 Aug 2019 10:54), Max: 98.8 (02 Aug 2019 10:54)  HR: 60 (02 Aug 2019 10:54) (60 - 71)  BP: 117/86 (02 Aug 2019 10:54) (114/89 - 131/58)  BP(mean): --  ABP: --  ABP(mean): --  RR: 16 (02 Aug 2019 10:54) (16 - 16)  SpO2: 94% (02 Aug 2019 10:54) (92% - 94%)      PHYSICAL EXAM-    Constitutional:  elderly frail female in no acute distress, hard of hearing     Head: Head is normocephalic and atraumatic.      Neck: No JVD.     Cardiovascular: Regular rate and rhythm without S3, S4. No murmurs or rubs are appreciated.      Respiratory: Breath sounds are normal. No rales. No wheezing.    Abdomen: Soft, nontender, nondistended with positive bowel sounds.      Extremity: No tenderness. No  pitting edema     Neurologic: The patient is alert and oriented.      Skin: No rash, no obvious lesions noted.      Psychiatric: The patient appears to be emotionally stable.      INTERPRETATION OF TELEMETRY: SR     ECG: Sinus bradycardia, L axis, LBBB    I&O's Detail                            10.5   11.21 )-----------( 147      ( 02 Aug 2019 07:39 )             31.7     08-02    139  |  103  |  25<H>  ----------------------------<  194<H>  4.6   |  31  |  1.26    Ca    9.2      02 Aug 2019 07:39  Mg     2.1     08-01    TPro  7.6  /  Alb  3.7  /  TBili  0.2  /  DBili  x   /  AST  20  /  ALT  26  /  AlkPhos  100  08-01    CARDIAC MARKERS ( 01 Aug 2019 02:46 )  <0.015 ng/mL / x     / x     / x     / x      CARDIAC MARKERS ( 01 Aug 2019 00:11 )  <0.015 ng/mL / x     / x     / x     / x          LIVER FUNCTIONS - ( 01 Aug 2019 00:11 )  Alb: 3.7 g/dL / Pro: 7.6 gm/dL / ALK PHOS: 100 U/L / ALT: 26 U/L / AST: 20 U/L / GGT: x           PT/INR - ( 01 Aug 2019 00:11 )   PT: 10.8 sec;   INR: 0.97 ratio         PTT - ( 01 Aug 2019 00:11 )  PTT:27.0 sec          PT/INR - ( 01 Aug 2019 00:11 )   PT: 10.8 sec;   INR: 0.97 ratio         PTT - ( 01 Aug 2019 00:11 )  PTT:27.0 sec    I&O's Summary    BNP  RADIOLOGY & ADDITIONAL STUDIES:  < from: CT Angio Chest PE Protocol w/ IV Cont (08.01.19 @ 04:11) >    EXAM:  CTA CHEST PE PROTOCOL (W)AW IC                            PROCEDURE DATE:  08/01/2019          INTERPRETATION:  CLINICAL INFORMATION: Dyspnea    COMPARISON: Same day earlier    PROCEDURE:   CT Angiography of the Chest.  90 ml of Omnipaque 350 was injected intravenously. 10 ml were discarded.  Sagittal and coronal reformats were performed as well as 3D (MIP)   reconstructions.      FINDINGS:    LUNGS AND AIRWAYS: Patent central airways.  Dependent atelectasis. 8 mm   nodule in the right upper lobe without interval change.    PLEURA: No pleural effusion.    MEDIASTINUM AND DEA: No lymphadenopathy.    VESSELS: No pulmonary thromboembolism. Moderate atherosclerotic   calcification.    HEART: Heart size is normal. No pericardial effusion.    CHEST WALL AND LOWER NECK: Within normal limits.    VISUALIZED UPPER ABDOMEN: 3 cm sized left renal cyst.    BONES: Old compression of T11 and T12.    IMPRESSION:     No pulmonary thromboembolism.                    DANDRE MIKEO   This document has been electronically signed. Aug  1 2019  4:30AM        < end of copied text >  < from: CT Chest No Cont (08.01.19 @ 00:37) >  EXAM:  CT ABDOMEN AND PELVIS                          EXAM:  CT CHEST                            PROCEDURE DATE:  08/01/2019          INTERPRETATION:  CLINICAL INFORMATION: Status post fall    COMPARISON: Comparison is made to the images of the lung apices on a   cervical spine CT of 12/19/2018    PROCEDURE:   CT of the Chest, Abdomen and Pelvis was performed without intravenous   contrast.   Intravenous contrast: None.  Oral contrast: None.  Sagittal and coronal reformats were performed.    FINDINGS: Study limited by lack of IV contrast.    CHEST:     LUNGS AND LARGE AIRWAYS: Patent central airways. Stable 8 mm right apical   pulmonary nodule..  PLEURA: No pneumothorax or pleural effusion.  VESSELS: Within normal limits.  HEART: Heart sizeis normal.No pericardial effusion.  MEDIASTINUM AND DEA: No hematoma or lymphadenopathy. Calcified   mediastinal lymph nodes.  CHEST WALL AND LOWER NECK: Left thyroid lobe coarse calcification..    ABDOMEN AND PELVIS:    LIVER: Within normal limits.  BILE DUCTS: Normal caliber.  GALLBLADDER: Within normal limits.  SPLEEN: Within normal limits.  PANCREAS: Within normal limits.  ADRENALS: Within normal limits.  KIDNEYS/URETERS: 3 cm left upper pole renal cyst..    BLADDER: Within normal limits.  REPRODUCTIVE ORGANS: The uterus and adnexa are within normal limits.    BOWEL: No bowel obstruction. Appendix normal. Diverticulosis coli.  PERITONEUM: No ascites.  VESSELS:  Within normal limits.  RETROPERITONEUM: No lymphadenopathy.    ABDOMINAL WALL: Within normal limits.  BONES: Old healed left inferior and superior pubic rami fractures. No   acute fractures. Degenerative changes of the spine. Mild chronic   appearing compression deformities of T11-L1 and L4.    IMPRESSION: No evidence for thoracic or abdominal trauma within the   limits of this noncontrast study.    Stable 8 mm right apical pulmonary nodule.              < end of copied text >

## 2019-08-02 NOTE — DISCHARGE NOTE PROVIDER - NSDCCPCAREPLAN_GEN_ALL_CORE_FT
PRINCIPAL DISCHARGE DIAGNOSIS  Diagnosis: Syncope  Assessment and Plan of Treatment: Likely fall from UTI, no signs of arrthymias on telemetry.   - take antibiotic for UTI  - PT recommended X 3 weeks for strengthening, ambulate with walker.      SECONDARY DISCHARGE DIAGNOSES  Diagnosis: Back pain  Assessment and Plan of Treatment: Chronic low back pain --> can take Tylenol 3x a day as needed

## 2019-08-02 NOTE — DISCHARGE NOTE PROVIDER - HOSPITAL COURSE
Please see H&P for full details.         Patient admitted for syncope, has hx of dementia and poor historian. Patient found to have foul smelling urine and had positive UA. Apparently patient had outpatient u. ctx from 7/25/19 which revealed UTI E. coli w/ sensitivities (faxed over from the [facility]).     - she was monitored on telemetry w/o evidence of arrhythmias.     - CT head unremarkable     - seen by PT and rec outpatient PT 3-5x / week X 3 weeks for gait instability.         2) Type II DM - A1c 8.4% --> increased Metformin to 500mg BID.     3) UTI - give IV Rocephin today and dc on PO Ceftin to complete 5 days treatment.         Subj: feels well, no CP / SOB, wants to go home.     ROS; neg unless stated above.         Vital Signs Last 24 Hrs    T(C): 37.1 (02 Aug 2019 10:54), Max: 37.1 (02 Aug 2019 10:54)    T(F): 98.8 (02 Aug 2019 10:54), Max: 98.8 (02 Aug 2019 10:54)    HR: 60 (02 Aug 2019 10:54) (60 - 71)    BP: 117/86 (02 Aug 2019 10:54) (114/89 - 131/58)    BP(mean): --    RR: 16 (02 Aug 2019 10:54) (16 - 16)    SpO2: 94% (02 Aug 2019 10:54) (92% - 94%)    PHYSICAL EXAM:        Constitutional: NAD, awake and alert, well-developed elderly female.     HEENT: PERR, EOMI, Normal Hearing, MMM    Neck: Soft and supple, No LAD, No JVD    Respiratory: Breath sounds are clear bilaterally, No wheezing, rales or rhonchi    Cardiovascular: S1 and S2, regular rate and rhythm.    Gastrointestinal: Bowel Sounds present, soft, nontender, nondistended, no guarding, no rebound    Extremities: No peripheral edema    Vascular: 2+ peripheral pulses    Neurological: A/O x 2 no focal deficits    Musculoskeletal: 5/5 strength b/l upper and lower extremities    Skin: No rashes    LABS: All Labs Reviewed:               DC note to be faxed to PCP. Daughter aware of plan.

## 2019-08-03 ENCOUNTER — INPATIENT (INPATIENT)
Facility: HOSPITAL | Age: 84
LOS: 3 days | Discharge: HOME HEALTH | DRG: 552 | End: 2019-08-07
Attending: INTERNAL MEDICINE | Admitting: HOSPITALIST
Payer: MEDICARE

## 2019-08-03 VITALS
HEART RATE: 64 BPM | OXYGEN SATURATION: 93 % | RESPIRATION RATE: 18 BRPM | WEIGHT: 125 LBS | SYSTOLIC BLOOD PRESSURE: 134 MMHG | TEMPERATURE: 98 F | HEIGHT: 61 IN | DIASTOLIC BLOOD PRESSURE: 52 MMHG

## 2019-08-03 LAB
ALBUMIN SERPL ELPH-MCNC: 3.5 G/DL — SIGNIFICANT CHANGE UP (ref 3.3–5)
ALP SERPL-CCNC: 81 U/L — SIGNIFICANT CHANGE UP (ref 40–120)
ALT FLD-CCNC: 21 U/L — SIGNIFICANT CHANGE UP (ref 12–78)
ANION GAP SERPL CALC-SCNC: 7 MMOL/L — SIGNIFICANT CHANGE UP (ref 5–17)
AST SERPL-CCNC: 20 U/L — SIGNIFICANT CHANGE UP (ref 15–37)
BILIRUB SERPL-MCNC: 0.4 MG/DL — SIGNIFICANT CHANGE UP (ref 0.2–1.2)
BUN SERPL-MCNC: 32 MG/DL — HIGH (ref 7–23)
CALCIUM SERPL-MCNC: 9.3 MG/DL — SIGNIFICANT CHANGE UP (ref 8.5–10.1)
CHLORIDE SERPL-SCNC: 103 MMOL/L — SIGNIFICANT CHANGE UP (ref 96–108)
CO2 SERPL-SCNC: 28 MMOL/L — SIGNIFICANT CHANGE UP (ref 22–31)
CREAT SERPL-MCNC: 1.38 MG/DL — HIGH (ref 0.5–1.3)
GLUCOSE SERPL-MCNC: 259 MG/DL — HIGH (ref 70–99)
HCT VFR BLD CALC: 35.4 % — SIGNIFICANT CHANGE UP (ref 34.5–45)
HGB BLD-MCNC: 11.2 G/DL — LOW (ref 11.5–15.5)
LIDOCAIN IGE QN: 117 U/L — SIGNIFICANT CHANGE UP (ref 73–393)
MAGNESIUM SERPL-MCNC: 2.5 MG/DL — SIGNIFICANT CHANGE UP (ref 1.6–2.6)
MCHC RBC-ENTMCNC: 30.9 PG — SIGNIFICANT CHANGE UP (ref 27–34)
MCHC RBC-ENTMCNC: 31.6 GM/DL — LOW (ref 32–36)
MCV RBC AUTO: 97.5 FL — SIGNIFICANT CHANGE UP (ref 80–100)
PLATELET # BLD AUTO: 170 K/UL — SIGNIFICANT CHANGE UP (ref 150–400)
POTASSIUM SERPL-MCNC: 4.5 MMOL/L — SIGNIFICANT CHANGE UP (ref 3.5–5.3)
POTASSIUM SERPL-SCNC: 4.5 MMOL/L — SIGNIFICANT CHANGE UP (ref 3.5–5.3)
PROT SERPL-MCNC: 8.1 GM/DL — SIGNIFICANT CHANGE UP (ref 6–8.3)
RBC # BLD: 3.63 M/UL — LOW (ref 3.8–5.2)
RBC # FLD: 12.6 % — SIGNIFICANT CHANGE UP (ref 10.3–14.5)
SODIUM SERPL-SCNC: 138 MMOL/L — SIGNIFICANT CHANGE UP (ref 135–145)
TROPONIN I SERPL-MCNC: <0.015 NG/ML — SIGNIFICANT CHANGE UP (ref 0.01–0.04)
WBC # BLD: 12.89 K/UL — HIGH (ref 3.8–10.5)
WBC # FLD AUTO: 12.89 K/UL — HIGH (ref 3.8–10.5)

## 2019-08-03 PROCEDURE — 93005 ELECTROCARDIOGRAM TRACING: CPT

## 2019-08-03 PROCEDURE — 97530 THERAPEUTIC ACTIVITIES: CPT | Mod: GP

## 2019-08-03 PROCEDURE — 97162 PT EVAL MOD COMPLEX 30 MIN: CPT | Mod: GP

## 2019-08-03 PROCEDURE — 72131 CT LUMBAR SPINE W/O DYE: CPT | Mod: 26

## 2019-08-03 PROCEDURE — 82306 VITAMIN D 25 HYDROXY: CPT

## 2019-08-03 PROCEDURE — 83735 ASSAY OF MAGNESIUM: CPT

## 2019-08-03 PROCEDURE — 82962 GLUCOSE BLOOD TEST: CPT

## 2019-08-03 PROCEDURE — 83690 ASSAY OF LIPASE: CPT

## 2019-08-03 PROCEDURE — 36415 COLL VENOUS BLD VENIPUNCTURE: CPT

## 2019-08-03 PROCEDURE — 93010 ELECTROCARDIOGRAM REPORT: CPT

## 2019-08-03 PROCEDURE — 99285 EMERGENCY DEPT VISIT HI MDM: CPT

## 2019-08-03 PROCEDURE — 72125 CT NECK SPINE W/O DYE: CPT | Mod: 26

## 2019-08-03 PROCEDURE — 71045 X-RAY EXAM CHEST 1 VIEW: CPT

## 2019-08-03 PROCEDURE — 97116 GAIT TRAINING THERAPY: CPT | Mod: GP

## 2019-08-03 PROCEDURE — 85027 COMPLETE CBC AUTOMATED: CPT

## 2019-08-03 PROCEDURE — 72131 CT LUMBAR SPINE W/O DYE: CPT

## 2019-08-03 PROCEDURE — 72125 CT NECK SPINE W/O DYE: CPT

## 2019-08-03 PROCEDURE — 84484 ASSAY OF TROPONIN QUANT: CPT

## 2019-08-03 PROCEDURE — 80053 COMPREHEN METABOLIC PANEL: CPT

## 2019-08-03 PROCEDURE — 70450 CT HEAD/BRAIN W/O DYE: CPT

## 2019-08-03 PROCEDURE — 71045 X-RAY EXAM CHEST 1 VIEW: CPT | Mod: 26

## 2019-08-03 PROCEDURE — 70450 CT HEAD/BRAIN W/O DYE: CPT | Mod: 26

## 2019-08-03 PROCEDURE — 80048 BASIC METABOLIC PNL TOTAL CA: CPT

## 2019-08-03 RX ORDER — ACETAMINOPHEN 500 MG
650 TABLET ORAL EVERY 6 HOURS
Refills: 0 | Status: DISCONTINUED | OUTPATIENT
Start: 2019-08-03 | End: 2019-08-05

## 2019-08-03 RX ORDER — CHOLECALCIFEROL (VITAMIN D3) 125 MCG
1 CAPSULE ORAL
Qty: 0 | Refills: 0 | DISCHARGE

## 2019-08-03 NOTE — ED PROVIDER NOTE - MUSCULOSKELETAL, MLM
Spine appears normal, range of motion is not limited, no muscle or joint tenderness Spine appears normal, range of motion is not limited, no muscle or joint tenderness. +TTP to left head and lower back

## 2019-08-03 NOTE — ED PROVIDER NOTE - CONSTITUTIONAL, MLM
normal... Well appearing, well nourished, awake, alert, oriented to person, place, time/situation and in no apparent distress. Well appearing, well nourished, awake, alert, oriented to person, place, and in no apparent distress.

## 2019-08-03 NOTE — ED PROVIDER NOTE - OBJECTIVE STATEMENT
89 y/o female with a PMHx of dementia, DM, HTN, spinal stenosis presents to the ED s/p unwitnessed fall. Unknown LOC. Pt is c/o of lower back pain. GCS 14 as per EMS. Pt had a fall two days ago and visited ED. On blood thinners. 89 y/o female with a PMHx of dementia, DM, HTN, spinal stenosis presents to the ED s/p unwitnessed fall. Unknown LOC. Pt is c/o of lower back pain. GCS 14 as per EMS. Pt is a poor historian due to current condition. Pt had a fall two days ago and visited ED. On blood thinners. 89 y/o female with a PMHx of dementia, DM, HTN, spinal stenosis presents to the ED s/p unwitnessed fall. Unknown LOC. Pt brought in from assisted living found on floor awake and at baseline unclear cause as to why she fell. C/o left sided head pain and back pain. Pt is unclear as to why she fell. GCS 14 as per EMS. Pt had a fall two days ago and visited ED. On blood thinners.

## 2019-08-03 NOTE — ED PROVIDER NOTE - CARE PLAN
Principal Discharge DX:	Fall, initial encounter  Secondary Diagnosis:	Compression fracture of L4 vertebra, initial encounter

## 2019-08-04 DIAGNOSIS — S22.040A WEDGE COMPRESSION FRACTURE OF FOURTH THORACIC VERTEBRA, INITIAL ENCOUNTER FOR CLOSED FRACTURE: ICD-10-CM

## 2019-08-04 LAB
HCT VFR BLD CALC: 31.8 % — LOW (ref 34.5–45)
HGB BLD-MCNC: 10.3 G/DL — LOW (ref 11.5–15.5)
MCHC RBC-ENTMCNC: 31 PG — SIGNIFICANT CHANGE UP (ref 27–34)
MCHC RBC-ENTMCNC: 32.4 GM/DL — SIGNIFICANT CHANGE UP (ref 32–36)
MCV RBC AUTO: 95.8 FL — SIGNIFICANT CHANGE UP (ref 80–100)
PLATELET # BLD AUTO: 155 K/UL — SIGNIFICANT CHANGE UP (ref 150–400)
RBC # BLD: 3.32 M/UL — LOW (ref 3.8–5.2)
RBC # FLD: 12.5 % — SIGNIFICANT CHANGE UP (ref 10.3–14.5)
WBC # BLD: 12.34 K/UL — HIGH (ref 3.8–10.5)
WBC # FLD AUTO: 12.34 K/UL — HIGH (ref 3.8–10.5)

## 2019-08-04 RX ORDER — BUPROPION HYDROCHLORIDE 150 MG/1
150 TABLET, EXTENDED RELEASE ORAL DAILY
Refills: 0 | Status: DISCONTINUED | OUTPATIENT
Start: 2019-08-04 | End: 2019-08-07

## 2019-08-04 RX ORDER — DEXTROSE 50 % IN WATER 50 %
15 SYRINGE (ML) INTRAVENOUS ONCE
Refills: 0 | Status: DISCONTINUED | OUTPATIENT
Start: 2019-08-04 | End: 2019-08-07

## 2019-08-04 RX ORDER — ASPIRIN/CALCIUM CARB/MAGNESIUM 324 MG
81 TABLET ORAL DAILY
Refills: 0 | Status: DISCONTINUED | OUTPATIENT
Start: 2019-08-04 | End: 2019-08-07

## 2019-08-04 RX ORDER — DEXTROSE 50 % IN WATER 50 %
25 SYRINGE (ML) INTRAVENOUS ONCE
Refills: 0 | Status: DISCONTINUED | OUTPATIENT
Start: 2019-08-04 | End: 2019-08-07

## 2019-08-04 RX ORDER — PETROLATUM,WHITE
1 JELLY (GRAM) TOPICAL AT BEDTIME
Refills: 0 | Status: DISCONTINUED | OUTPATIENT
Start: 2019-08-04 | End: 2019-08-07

## 2019-08-04 RX ORDER — MIRTAZAPINE 45 MG/1
30 TABLET, ORALLY DISINTEGRATING ORAL AT BEDTIME
Refills: 0 | Status: DISCONTINUED | OUTPATIENT
Start: 2019-08-04 | End: 2019-08-07

## 2019-08-04 RX ORDER — DEXTROSE 50 % IN WATER 50 %
12.5 SYRINGE (ML) INTRAVENOUS ONCE
Refills: 0 | Status: DISCONTINUED | OUTPATIENT
Start: 2019-08-04 | End: 2019-08-07

## 2019-08-04 RX ORDER — CEFUROXIME AXETIL 250 MG
250 TABLET ORAL EVERY 12 HOURS
Refills: 0 | Status: DISCONTINUED | OUTPATIENT
Start: 2019-08-04 | End: 2019-08-07

## 2019-08-04 RX ORDER — INSULIN LISPRO 100/ML
VIAL (ML) SUBCUTANEOUS
Refills: 0 | Status: DISCONTINUED | OUTPATIENT
Start: 2019-08-04 | End: 2019-08-07

## 2019-08-04 RX ORDER — GUAIFENESIN/DEXTROMETHORPHAN 600MG-30MG
10 TABLET, EXTENDED RELEASE 12 HR ORAL THREE TIMES A DAY
Refills: 0 | Status: DISCONTINUED | OUTPATIENT
Start: 2019-08-04 | End: 2019-08-07

## 2019-08-04 RX ORDER — LIDOCAINE 4 G/100G
1 CREAM TOPICAL DAILY
Refills: 0 | Status: DISCONTINUED | OUTPATIENT
Start: 2019-08-04 | End: 2019-08-07

## 2019-08-04 RX ORDER — SODIUM CHLORIDE 9 MG/ML
1000 INJECTION, SOLUTION INTRAVENOUS
Refills: 0 | Status: DISCONTINUED | OUTPATIENT
Start: 2019-08-04 | End: 2019-08-07

## 2019-08-04 RX ORDER — MEMANTINE HYDROCHLORIDE 10 MG/1
10 TABLET ORAL
Refills: 0 | Status: DISCONTINUED | OUTPATIENT
Start: 2019-08-04 | End: 2019-08-07

## 2019-08-04 RX ORDER — LANOLIN ALCOHOL/MO/W.PET/CERES
5 CREAM (GRAM) TOPICAL AT BEDTIME
Refills: 0 | Status: DISCONTINUED | OUTPATIENT
Start: 2019-08-04 | End: 2019-08-07

## 2019-08-04 RX ORDER — GLUCAGON INJECTION, SOLUTION 0.5 MG/.1ML
1 INJECTION, SOLUTION SUBCUTANEOUS ONCE
Refills: 0 | Status: DISCONTINUED | OUTPATIENT
Start: 2019-08-04 | End: 2019-08-07

## 2019-08-04 RX ADMIN — MIRTAZAPINE 30 MILLIGRAM(S): 45 TABLET, ORALLY DISINTEGRATING ORAL at 22:05

## 2019-08-04 RX ADMIN — MEMANTINE HYDROCHLORIDE 10 MILLIGRAM(S): 10 TABLET ORAL at 18:31

## 2019-08-04 RX ADMIN — Medication 81 MILLIGRAM(S): at 12:45

## 2019-08-04 RX ADMIN — MEMANTINE HYDROCHLORIDE 10 MILLIGRAM(S): 10 TABLET ORAL at 05:09

## 2019-08-04 RX ADMIN — Medication 2: at 17:36

## 2019-08-04 RX ADMIN — Medication 1: at 08:12

## 2019-08-04 RX ADMIN — LIDOCAINE 1 PATCH: 4 CREAM TOPICAL at 14:53

## 2019-08-04 RX ADMIN — LIDOCAINE 1 PATCH: 4 CREAM TOPICAL at 12:46

## 2019-08-04 RX ADMIN — Medication 5 MILLIGRAM(S): at 22:05

## 2019-08-04 RX ADMIN — Medication 10 MILLILITER(S): at 22:06

## 2019-08-04 RX ADMIN — Medication 10 MILLILITER(S): at 05:09

## 2019-08-04 RX ADMIN — Medication 650 MILLIGRAM(S): at 05:09

## 2019-08-04 RX ADMIN — Medication 1: at 12:46

## 2019-08-04 RX ADMIN — Medication 10 MILLILITER(S): at 18:31

## 2019-08-04 RX ADMIN — Medication 250 MILLIGRAM(S): at 18:31

## 2019-08-04 RX ADMIN — Medication 250 MILLIGRAM(S): at 05:09

## 2019-08-04 RX ADMIN — BUPROPION HYDROCHLORIDE 150 MILLIGRAM(S): 150 TABLET, EXTENDED RELEASE ORAL at 12:45

## 2019-08-04 NOTE — CHART NOTE - NSCHARTNOTEFT_GEN_A_CORE
Hospitalist Update Note    Patient seen and examined, VS reviewed. Case discussed in depth with daughter at bedside. Patient known to me from prior discharge 2 days ago.   Unfortunately, fell last night when going to the bathroom, no CP.     Vital Signs Last 24 Hrs  T(C): 36.4 (04 Aug 2019 04:35), Max: 36.9 (04 Aug 2019 00:14)  T(F): 97.5 (04 Aug 2019 04:35), Max: 98.4 (04 Aug 2019 00:14)  HR: 66 (04 Aug 2019 04:35) (64 - 70)  BP: 132/62 (04 Aug 2019 04:35) (132/62 - 158/54)  BP(mean): --  RR: 18 (04 Aug 2019 04:35) (16 - 18)  SpO2: 91% (04 Aug 2019 04:35) (91% - 94%)    Gen: sleepy but arousable, elderly female, forgetful  CV: +S1, S2, RRR  Resp: CTA B/L, no wheezing /rhonchi /rales.   Abd; soft/ NT/ND, +BS  Ext: intact pulses, no LE edema.     All labs/ images reviewed.     CT Lumbar Spine No Cont (08.03.19 @ 21:44) >IMPRESSION:  Acute-subacute L4 compression fracture. T12 compression fracture could   also possibly be acute-subacute. L1 compression deformity is chronic. If   clinically   indicated, further evaluation with noncontrast MRI of the lumbar spine   may be   helpful (especially to better determine the age of the T12 compression   deformity).  Mild central stenosis at T12-L1, moderate L1-2 central   stenosis, moderate to severe L2-3 central stenosis, severe L3-4 and L4-5   central stenosis on a degenerative basis.          A&P;     # Traumatic Fall  - now with acute to subacute L4 and T12.   - will attempt pain control, bedrest until seen by Ortho then will need PT.   - needs higher level of care upon discharge given frequent falls.   - discussed extensively with daughter and SW.     Remain inpatient.

## 2019-08-04 NOTE — H&P ADULT - HISTORY OF PRESENT ILLNESS
89 y/o female with a PMHx of dementia, DM, HTN, spinal stenosis presents to the ED s/p unwitnessed fall. Unknown LOC. Pt brought in from assisted living found on floor awake and at baseline unclear cause as to why she fell. C/o left sided head pain and back pain. Pt is unclear as to why she fell. GCS 14 as per EMS. Pt had a fall two days ago and visited ED 89 y/o female with a PMHx of dementia, DM, HTN, spinal stenosis presents to the ED after unwitnessed fall. Unknown about loss of consciousness. Patient brought in from assisted living found on floor awake and at baseline unclear cause as to why she fell. She complains of left sided head pain and back pain. Patient is unclear as to why she fell. GCS 14 as per EMS. Pt had a fall two days ago and visited ED. Unable to get anymore detail due to patient's baseline dementia status.     Family Hx: patient is unable to provide detail family history this time.

## 2019-08-04 NOTE — CONSULT NOTE ADULT - SUBJECTIVE AND OBJECTIVE BOX
89 yo F presented to the ED yesterday s/p a mechanical fall. Due to patient's mental status, history was obtained from collateral sources. The patient reportedly had an unwitnessed fall yesterday evening. Patient has been reporting pain in the lower back. Unable to assess numbness or tingling, or ROS secondary to mental status.    PAST MEDICAL & SURGICAL HISTORY:  Spinal stenosis  Diabetes  HTN (hypertension)  Dementia  No significant past surgical history    Allergies: No Known Allergies    Vital Signs Last 24 Hrs  T(C): 36.4 (04 Aug 2019 04:35), Max: 36.9 (04 Aug 2019 00:14)  T(F): 97.5 (04 Aug 2019 04:35), Max: 98.4 (04 Aug 2019 00:14)  HR: 66 (04 Aug 2019 04:35) (64 - 70)  BP: 132/62 (04 Aug 2019 04:35) (132/62 - 158/54)  BP(mean): --  RR: 18 (04 Aug 2019 04:35) (16 - 18)  SpO2: 91% (04 Aug 2019 04:35) (91% - 94%)    General: NAD, resting comfortably in bed sleeping  Spine: TTP localized to the thoracolumbar spine, no TTP to the c/s spine    Bilateral Upper Extremities:  Skin intact, no TTP  shoulder abduction 5/5  Elbow flexion/Extension 5/5  Wrist flexion/extension 5/5  Finger abduction 5/5  SILT C5-T1, 2+ radial pulses, negative alcantara    Bilateral Lower extremities  Hip flexion/extension 5/5  knee flexion/extension 5/5  Ankle dorsi/plantar flexion 5/5  Great toe flexion/extension 5/5  TA/EHL/FHL/GSC+  SILT L2-S1  DP 2+  Neg babinski, clonus    Imaging:  CT L Spine: Acute/subacute T12, L4 Burst Fxs. Chronic L1 VCFx

## 2019-08-04 NOTE — CONSULT NOTE ADULT - ASSESSMENT
Elderly woman with dementia  Long history of chronic lower back issues    CT scan with chronic stenosis age indeterminate compression fractures.    Family does not wish aggressive intervention in terms of surgery or pain management procedures    Recommend supportive care. Bracing not indicated    Mobilization with PT consult. Minimal pain medications as needed.    May require nursing home placement    Recall as needed.

## 2019-08-04 NOTE — CONSULT NOTE ADULT - ASSESSMENT
89 yo F with acute/subacture T12, L4 vertebral burst fxs  Pain Control - NSAIDs PRN  DVT ppx per primary  PT/OT - WBAT, OOBTC  Consider TLSO brace if symptoms worsen  Recommend follow up outpatient with Dr. Vázquez.    No acute orthopaedic surgical intervention at this time.  Case to be discussed with Dr. Vázquez. Will advise if plan changes.  Ortho stable for DC

## 2019-08-04 NOTE — H&P ADULT - NSHPPHYSICALEXAM_GEN_ALL_CORE
Vital Signs Last 24 Hrs  T(C): 36.9 (04 Aug 2019 00:14), Max: 36.9 (04 Aug 2019 00:14)  T(F): 98.4 (04 Aug 2019 00:14), Max: 98.4 (04 Aug 2019 00:14)  HR: 70 (04 Aug 2019 00:14) (64 - 70)  BP: 158/54 (04 Aug 2019 00:14) (133/53 - 158/54)  RR: 17 (04 Aug 2019 00:14) (16 - 18)  SpO2: 94% (04 Aug 2019 00:14) (93% - 94%)

## 2019-08-04 NOTE — H&P ADULT - ASSESSMENT
89 yo Female presented after fall.    A/P:    1.  Fall  Lumbar spine compression fracture: Acute vs Subacute  -Keep on fall precaution  -follow Ortho spine consult  -follow clinically for pain control    2.  DM  -follow Dxt  -keep on ISS    3.  Dementia  -continue home meds    4.  UTI  -on oral cefuroxime  -will complete the course of Abx    5.  SCD for DVT ppx     6.  Advance directive:  MOLST form in the chart.  Patient is DNR/DNI.

## 2019-08-04 NOTE — CONSULT NOTE ADULT - SUBJECTIVE AND OBJECTIVE BOX
Patient sen ad examined  Chart reviewed    Asked to evaluate this 89 y/o female with a PMHx of dementia, DM, HTN, spinal stenosis who presents to the ED after unwitnessed fall.   She has had multiple recent falls in Memory Care unit at [place]. Recently evaluated at [facility] ED for another fall last week.She complains of left sided head pain and back pain. Patient is unclear as to why she fell.   Daughter is at bedside. She has a LONG hx of degen lower back issues in the past with chronic pain.  S/P laminectomy 20 + years ago. Was previously under care of "pain management" - S/P IVETH's, was on duragesic patch at one point    A CT scan was done which demonstrated fractutres, DDD and stenosis    No reports of sciatica or LE numbness  Progressive gait issues ?dementia    PE:    Elderly woman in bed, NAD  Mild right sided lower back tenderness, no cevical or thoracic tenderness  No focal LE motor deficits, sensory exam difficult  Hip exam (S/P ORIF) - painless bilateral  No external signs of trauma or head trauma    CT scan:   	?subacute moderate compression fracture of L4 vertebral body with minimal   	vertebral body height loss of approximately 60%; no significant   	retropulsion.   	  	 Another moderate anterior wedged compression fracture at T12, L1 appears chronic. It demonstrates   	mild-moderate retropulsion, resulting in mild to moderate canal stenosis at T11-12 level.   	  	Severe degenerative changes in lumbar spine with varying degrees of moderate to severe stenosis, post surgical changes left L5-S1 Patient seen and examined  Chart reviewed    Asked to evaluate this 89 y/o female with a PMHx of dementia, DM, HTN, spinal stenosis who presents to the ED after unwitnessed fall.   She has had multiple recent falls in Memory Care unit at [place]. Recently evaluated at [facility] ED for another fall last week.She complains of left sided head pain and back pain. Patient is unclear as to why she fell.   Daughter is at bedside. She has a LONG hx of degen lower back issues in the past with chronic pain.  S/P laminectomy 20 + years ago. Was previously under care of "pain management" - S/P IVETH's, was on duragesic patch at one point    A CT scan was done which demonstrated fractutres, DDD and stenosis    No reports of sciatica or LE numbness  Progressive gait issues ?dementia    PE:    Elderly woman in bed, NAD  Mild right sided lower back tenderness, no cevical or thoracic tenderness  No focal LE motor deficits, sensory exam difficult  Hip exam (S/P ORIF) - painless bilateral  No external signs of trauma or head trauma    CT scan:   	?subacute moderate compression fracture of L4 vertebral body with minimal   	vertebral body height loss of approximately 60%; no significant   	retropulsion.   	  	 Another moderate anterior wedged compression fracture at T12, L1 appears chronic. It demonstrates   	mild-moderate retropulsion, resulting in mild to moderate canal stenosis at T11-12 level.   	  	Severe degenerative changes in lumbar spine with varying degrees of moderate to severe stenosis, post surgical changes left L5-S1

## 2019-08-05 RX ORDER — HEPARIN SODIUM 5000 [USP'U]/ML
5000 INJECTION INTRAVENOUS; SUBCUTANEOUS EVERY 12 HOURS
Refills: 0 | Status: DISCONTINUED | OUTPATIENT
Start: 2019-08-05 | End: 2019-08-07

## 2019-08-05 RX ORDER — ACETAMINOPHEN 500 MG
1000 TABLET ORAL EVERY 6 HOURS
Refills: 0 | Status: DISCONTINUED | OUTPATIENT
Start: 2019-08-05 | End: 2019-08-07

## 2019-08-05 RX ADMIN — Medication 10 MILLILITER(S): at 14:15

## 2019-08-05 RX ADMIN — Medication 250 MILLIGRAM(S): at 06:15

## 2019-08-05 RX ADMIN — Medication 81 MILLIGRAM(S): at 11:09

## 2019-08-05 RX ADMIN — Medication 10 MILLILITER(S): at 06:15

## 2019-08-05 RX ADMIN — LIDOCAINE 1 PATCH: 4 CREAM TOPICAL at 02:06

## 2019-08-05 RX ADMIN — Medication 650 MILLIGRAM(S): at 06:14

## 2019-08-05 RX ADMIN — LIDOCAINE 1 PATCH: 4 CREAM TOPICAL at 21:55

## 2019-08-05 RX ADMIN — Medication 1: at 07:52

## 2019-08-05 RX ADMIN — BUPROPION HYDROCHLORIDE 150 MILLIGRAM(S): 150 TABLET, EXTENDED RELEASE ORAL at 11:09

## 2019-08-05 RX ADMIN — MEMANTINE HYDROCHLORIDE 10 MILLIGRAM(S): 10 TABLET ORAL at 17:08

## 2019-08-05 RX ADMIN — Medication 250 MILLIGRAM(S): at 17:08

## 2019-08-05 RX ADMIN — LIDOCAINE 1 PATCH: 4 CREAM TOPICAL at 11:09

## 2019-08-05 RX ADMIN — MEMANTINE HYDROCHLORIDE 10 MILLIGRAM(S): 10 TABLET ORAL at 06:15

## 2019-08-05 RX ADMIN — Medication 1 APPLICATION(S): at 21:51

## 2019-08-05 RX ADMIN — Medication 1: at 12:52

## 2019-08-05 NOTE — PHYSICAL THERAPY INITIAL EVALUATION ADULT - DISCHARGE DISPOSITION, PT EVAL
rehabilitation facility/pt needs more consistent/close supervision,& assistance due to advanced dementia rehabilitation facility/pt needs more consistent/close supervision,& assistance due to advanced dementia ,higher level of care

## 2019-08-05 NOTE — PHYSICAL THERAPY INITIAL EVALUATION ADULT - GAIT DEVIATIONS NOTED, PT EVAL
decreased stride length/distractible, peering about into each room ,needs cues to focus attention on task/decreased step length

## 2019-08-05 NOTE — PHYSICAL THERAPY INITIAL EVALUATION ADULT - PATIENT PROFILE REVIEW, REHAB EVAL
yes/pt known to me from recent admission on 3E 8/1-8/2/19 after 2 unwitnessed falls in her private apartment at assisted living, pt with memory impairment ,may forget to consistently use the walker issued

## 2019-08-05 NOTE — PHYSICAL THERAPY INITIAL EVALUATION ADULT - GENERAL OBSERVATIONS, REHAB EVAL
pt resting in bed awake,responsive yet confused,pleasant & cooperative but speaking mostly Yiddish on this encounter which she did not do last week pt resting in bed awake, responsive yet confused, pleasant & cooperative but speaking mostly Yiddish on this encounter which she did not do last week

## 2019-08-05 NOTE — PROGRESS NOTE ADULT - SUBJECTIVE AND OBJECTIVE BOX
CC: back pain    HPI: This is a 89 y/o female with a PMHx of dementia, DM, HTN, spinal stenosis presents to the ED after unwitnessed fall. Patient was just admitted and discharged from  on Friday 8/2 after evaluation for unwitnessed fall. She was sent back to [place] however on Saturday evening fell before nighttime aides arrived for duty. She resides at the [facility] with 12hr night aides for assistance.   In the ED, CT L/S revealed new acute to subacute T12 and L4 compression fractures.     Subj; Seen with daughter at bedside, c/o of "mild back pain", has not ambulated out of bed as yet. No CP/SOB.    ROS: stated above otherwise negative     Vital Signs Last 24 Hrs  T(C): 36.6 (05 Aug 2019 11:24), Max: 36.7 (04 Aug 2019 21:06)  T(F): 97.8 (05 Aug 2019 11:24), Max: 98.1 (04 Aug 2019 21:06)  HR: 59 (05 Aug 2019 11:24) (57 - 62)  BP: 153/57 (05 Aug 2019 11:24) (134/52 - 153/57)  BP(mean): --  RR: 16 (05 Aug 2019 11:24) (16 - 18)  SpO2: 92% (05 Aug 2019 11:24) (91% - 93%)    PHYSICAL EXAM:  Constitutional: NAD, awake and alert, well-developed elderly female   HEENT: PERR, EOMI, Normal Hearing, MMM  Neck: Soft and supple, No LAD, No JVD  Respiratory: Breath sounds are clear bilaterally, No wheezing, rales or rhonchi  Cardiovascular: S1 and S2, regular rate and rhythm.  Gastrointestinal: Bowel Sounds present, soft, nontender, nondistended, no guarding, no rebound  Extremities: No peripheral edema  Vascular: 2+ peripheral pulses  Neurological: A/O x 3, no focal deficits  Musculoskeletal: 5/5 strength b/l upper and lower extremities - decreased strength 2/2 low back pain.   Skin: No rashes    MEDICATIONS  (STANDING):  AQUAPHOR (petrolatum Ointment) 1 Application(s) Topical at bedtime  aspirin enteric coated 81 milliGRAM(s) Oral daily  buPROPion XL . 150 milliGRAM(s) Oral daily  cefuroxime   Tablet 250 milliGRAM(s) Oral every 12 hours  dextrose 5%. 1000 milliLiter(s) (50 mL/Hr) IV Continuous <Continuous>  dextrose 50% Injectable 12.5 Gram(s) IV Push once  dextrose 50% Injectable 25 Gram(s) IV Push once  dextrose 50% Injectable 25 Gram(s) IV Push once  guaiFENesin/dextromethorphan  Syrup 10 milliLiter(s) Oral three times a day  insulin lispro (HumaLOG) corrective regimen sliding scale   SubCutaneous three times a day before meals  lidocaine   Patch 1 Patch Transdermal daily  melatonin 5 milliGRAM(s) Oral at bedtime  memantine 10 milliGRAM(s) Oral two times a day  mirtazapine 30 milliGRAM(s) Oral at bedtime    LABS: All Labs Reviewed:                        10.3   12.34 )-----------( 155      ( 04 Aug 2019 07:11 )             31.8     08-03    138  |  103  |  32<H>  ----------------------------<  259<H>  4.5   |  28  |  1.38<H>    Ca    9.3      03 Aug 2019 21:29  Mg     2.5     08-03    TPro  8.1  /  Alb  3.5  /  TBili  0.4  /  DBili  x   /  AST  20  /  ALT  21  /  AlkPhos  81  08-03  CARDIAC MARKERS ( 03 Aug 2019 21:29 )  <0.015 ng/mL / x     / x     / x     / x        Blood Culture: 08-02 @ 10:55  Organism --  Gram Stain Blood -- Gram Stain --  Specimen Source .Urine Catheterized  Culture-Blood --    08-01 @ 00:11  Organism --  Gram Stain Blood -- Gram Stain --  Specimen Source .Blood Blood  Culture-Blood --    CT Lumbar Spine No Cont (08.03.19 @ 21:44) >IMPRESSION:  Acute-subacute L4 compression fracture. T12 compression fracture could   also possibly be acute-subacute. L1 compression deformity is chronic. If   clinically   indicated, further evaluation with noncontrast MRI of the lumbar spine   may be   helpful (especially to better determine the age of the T12 compression   deformity).  Mild central stenosis at T12-L1, moderate L1-2 central   stenosis, moderate to severe L2-3 central stenosis, severe L3-4 and L4-5   central stenosis on a degenerative basis.

## 2019-08-05 NOTE — PHYSICAL THERAPY INITIAL EVALUATION ADULT - IMPAIRMENTS FOUND, PT EVAL
muscle strength/cognitive impairment/arousal, attention, and cognition/gait, locomotion, and balance no

## 2019-08-05 NOTE — PROGRESS NOTE ADULT - ASSESSMENT
# Traumatic Fall  - now with acute to subacute L4 and T12.   - discussed with Ortho: ambulate with assistance, no acute intevention.   - needs PT for RYDER hooks, pain control with ES Tylenol.   - check vitamin D level.    - needs higher level of care upon discharge given frequent falls.   - discussed extensively with daughter and SW.     # UTI - present on admission, on Ceftin to complete treatment.   - no symptoms.     # Type II DM  - A1c 8.4%, holding Metformin, continue SSI AC.     # Dementia - continue home meds.     DVT ppx: heparin subQ  Dispo; remain inpatient for ongoing care, PT consult and DC planning.   Code Status: DNR/DNI  Discussed with daughter and SW

## 2019-08-05 NOTE — PHYSICAL THERAPY INITIAL EVALUATION ADULT - CRITERIA FOR SKILLED THERAPEUTIC INTERVENTIONS
rehab potential/therapy frequency/functional limitations in following categories/predicted duration of therapy intervention/anticipated equipment needs at discharge/risk reduction/prevention/anticipated discharge recommendation/impairments found

## 2019-08-05 NOTE — PHYSICAL THERAPY INITIAL EVALUATION ADULT - PERTINENT HX OF CURRENT PROBLEM, REHAB EVAL
recurrent unwitnessed falls (pt had a fall last year sustaining R acetabular fx attended rehab at that time,had private HHA on return to [place] per dtr ) I had d/w daughter increasing HHA coverage (ie-private hire) last week

## 2019-08-05 NOTE — PHYSICAL THERAPY INITIAL EVALUATION ADULT - DIAGNOSIS, PT EVAL
falls/imbalance, lower back pain ,+acute-subacute L4 vertebral compression fx (60% height loss) ,?T12 acute to subacute compression fx with mod retropulsion resulting in moderate canal stenosis,multilevel DDD/stenosis severe @ L3/4,L4/5 ,dementia/memory impairment

## 2019-08-06 LAB
ANION GAP SERPL CALC-SCNC: 6 MMOL/L — SIGNIFICANT CHANGE UP (ref 5–17)
BUN SERPL-MCNC: 29 MG/DL — HIGH (ref 7–23)
CALCIUM SERPL-MCNC: 9.2 MG/DL — SIGNIFICANT CHANGE UP (ref 8.5–10.1)
CHLORIDE SERPL-SCNC: 106 MMOL/L — SIGNIFICANT CHANGE UP (ref 96–108)
CO2 SERPL-SCNC: 29 MMOL/L — SIGNIFICANT CHANGE UP (ref 22–31)
CREAT SERPL-MCNC: 1.11 MG/DL — SIGNIFICANT CHANGE UP (ref 0.5–1.3)
CULTURE RESULTS: SIGNIFICANT CHANGE UP
CULTURE RESULTS: SIGNIFICANT CHANGE UP
GLUCOSE SERPL-MCNC: 174 MG/DL — HIGH (ref 70–99)
HCT VFR BLD CALC: 32.7 % — LOW (ref 34.5–45)
HGB BLD-MCNC: 10.6 G/DL — LOW (ref 11.5–15.5)
MCHC RBC-ENTMCNC: 31.2 PG — SIGNIFICANT CHANGE UP (ref 27–34)
MCHC RBC-ENTMCNC: 32.4 GM/DL — SIGNIFICANT CHANGE UP (ref 32–36)
MCV RBC AUTO: 96.2 FL — SIGNIFICANT CHANGE UP (ref 80–100)
PLATELET # BLD AUTO: 207 K/UL — SIGNIFICANT CHANGE UP (ref 150–400)
POTASSIUM SERPL-MCNC: 4.3 MMOL/L — SIGNIFICANT CHANGE UP (ref 3.5–5.3)
POTASSIUM SERPL-SCNC: 4.3 MMOL/L — SIGNIFICANT CHANGE UP (ref 3.5–5.3)
RBC # BLD: 3.4 M/UL — LOW (ref 3.8–5.2)
RBC # FLD: 12.4 % — SIGNIFICANT CHANGE UP (ref 10.3–14.5)
SODIUM SERPL-SCNC: 141 MMOL/L — SIGNIFICANT CHANGE UP (ref 135–145)
SPECIMEN SOURCE: SIGNIFICANT CHANGE UP
SPECIMEN SOURCE: SIGNIFICANT CHANGE UP
WBC # BLD: 9.16 K/UL — SIGNIFICANT CHANGE UP (ref 3.8–10.5)
WBC # FLD AUTO: 9.16 K/UL — SIGNIFICANT CHANGE UP (ref 3.8–10.5)

## 2019-08-06 RX ORDER — HALOPERIDOL DECANOATE 100 MG/ML
1 INJECTION INTRAMUSCULAR EVERY 6 HOURS
Refills: 0 | Status: DISCONTINUED | OUTPATIENT
Start: 2019-08-06 | End: 2019-08-07

## 2019-08-06 RX ORDER — ACETAMINOPHEN 500 MG
1000 TABLET ORAL ONCE
Refills: 0 | Status: DISCONTINUED | OUTPATIENT
Start: 2019-08-06 | End: 2019-08-07

## 2019-08-06 RX ADMIN — Medication 1: at 17:54

## 2019-08-06 RX ADMIN — Medication 1000 MILLIGRAM(S): at 16:21

## 2019-08-06 RX ADMIN — MIRTAZAPINE 30 MILLIGRAM(S): 45 TABLET, ORALLY DISINTEGRATING ORAL at 21:57

## 2019-08-06 RX ADMIN — HEPARIN SODIUM 5000 UNIT(S): 5000 INJECTION INTRAVENOUS; SUBCUTANEOUS at 18:29

## 2019-08-06 RX ADMIN — Medication 250 MILLIGRAM(S): at 21:57

## 2019-08-06 RX ADMIN — MEMANTINE HYDROCHLORIDE 10 MILLIGRAM(S): 10 TABLET ORAL at 18:46

## 2019-08-06 RX ADMIN — Medication 1: at 08:32

## 2019-08-06 RX ADMIN — Medication 5 MILLIGRAM(S): at 21:57

## 2019-08-06 RX ADMIN — Medication 250 MILLIGRAM(S): at 08:32

## 2019-08-06 RX ADMIN — Medication 1 APPLICATION(S): at 21:57

## 2019-08-06 RX ADMIN — Medication 10 MILLILITER(S): at 21:57

## 2019-08-06 NOTE — DISCHARGE NOTE PROVIDER - NSDCCPCAREPLAN_GEN_ALL_CORE_FT
PRINCIPAL DISCHARGE DIAGNOSIS  Diagnosis: Fall, initial encounter  Assessment and Plan of Treatment: Physical therapy and walker for assistance      SECONDARY DISCHARGE DIAGNOSES  Diagnosis: Compression fracture of L4 vertebra, initial encounter  Assessment and Plan of Treatment:

## 2019-08-06 NOTE — PROGRESS NOTE ADULT - SUBJECTIVE AND OBJECTIVE BOX
CC: back pain    HPI: This is a 89 y/o female with a PMHx of dementia, DM, HTN, spinal stenosis presents to the ED after unwitnessed fall. Patient was just admitted and discharged from  on Friday 8/2 after evaluation for unwitnessed fall. She was sent back to [place] however on Saturday evening fell before nighttime aides arrived for duty. She resides at the [facility] with 12hr night aides for assistance.   In the ED, CT L/S revealed new acute to subacute T12 and L4 compression fractures.     Subj 8/5; Seen with daughter at bedside, c/o of "mild back pain", has not ambulated out of bed as yet. No CP/SOB.    8/6: Doesn't want to eat or answer questions, nods her head yes that she has back pain.     ROS: stated above otherwise negative     Vital Signs Last 24 Hrs  T(C): 36.2 (06 Aug 2019 12:01), Max: 36.6 (05 Aug 2019 21:11)  T(F): 97.2 (06 Aug 2019 12:01), Max: 97.9 (05 Aug 2019 21:11)  HR: 56 (06 Aug 2019 12:01) (55 - 60)  BP: 128/49 (06 Aug 2019 12:01) (128/49 - 136/40)  BP(mean): --  RR: 16 (06 Aug 2019 12:01) (16 - 17)  SpO2: 91% (06 Aug 2019 12:01) (91% - 93%)    PHYSICAL EXAM:  Constitutional: NAD, awake however not talking right now, well-developed elderly female   HEENT: PERR, EOMI, Normal Hearing, MMM  Neck: Soft and supple, No LAD, No JVD  Respiratory: Breath sounds are clear bilaterally, No wheezing, rales or rhonchi  Cardiovascular: S1 and S2, regular rate and rhythm.  Gastrointestinal: Bowel Sounds present, soft, nontender, nondistended, no guarding, no rebound  Extremities: No peripheral edema  Vascular: 2+ peripheral pulses  Neurological: A/O x 3, no focal deficits  Musculoskeletal: 5/5 strength b/l upper and lower extremities - decreased strength 2/2 low back pain.   Skin: No rashes    MEDICATIONS  (STANDING):  acetaminophen  IVPB .. 1000 milliGRAM(s) IV Intermittent once  AQUAPHOR (petrolatum Ointment) 1 Application(s) Topical at bedtime  aspirin enteric coated 81 milliGRAM(s) Oral daily  buPROPion XL . 150 milliGRAM(s) Oral daily  cefuroxime   Tablet 250 milliGRAM(s) Oral every 12 hours  dextrose 5%. 1000 milliLiter(s) (50 mL/Hr) IV Continuous <Continuous>  dextrose 50% Injectable 12.5 Gram(s) IV Push once  dextrose 50% Injectable 25 Gram(s) IV Push once  dextrose 50% Injectable 25 Gram(s) IV Push once  guaiFENesin/dextromethorphan  Syrup 10 milliLiter(s) Oral three times a day  heparin  Injectable 5000 Unit(s) SubCutaneous every 12 hours  insulin lispro (HumaLOG) corrective regimen sliding scale   SubCutaneous three times a day before meals  lidocaine   Patch 1 Patch Transdermal daily  melatonin 5 milliGRAM(s) Oral at bedtime  memantine 10 milliGRAM(s) Oral two times a day  mirtazapine 30 milliGRAM(s) Oral at bedtime      LABS: All Labs Reviewed:                        10.6   9.16  )-----------( 207      ( 06 Aug 2019 07:00 )             32.7                           10.3   12.34 )-----------( 155      ( 04 Aug 2019 07:11 )             31.8   08-06    141  |  106  |  29<H>  ----------------------------<  174<H>  4.3   |  29  |  1.11    Ca    9.2      06 Aug 2019 07:00      08-03    138  |  103  |  32<H>  ----------------------------<  259<H>  4.5   |  28  |  1.38<H>    Ca    9.3      03 Aug 2019 21:29  Mg     2.5     08-03    TPro  8.1  /  Alb  3.5  /  TBili  0.4  /  DBili  x   /  AST  20  /  ALT  21  /  AlkPhos  81  08-03  CARDIAC MARKERS ( 03 Aug 2019 21:29 )  <0.015 ng/mL / x     / x     / x     / x        Blood Culture: 08-02 @ 10:55  Organism --  Gram Stain Blood -- Gram Stain --  Specimen Source .Urine Catheterized  Culture-Blood --    08-01 @ 00:11  Organism --  Gram Stain Blood -- Gram Stain --  Specimen Source .Blood Blood  Culture-Blood --    CT Lumbar Spine No Cont (08.03.19 @ 21:44) >IMPRESSION:  Acute-subacute L4 compression fracture. T12 compression fracture could   also possibly be acute-subacute. L1 compression deformity is chronic. If   clinically   indicated, further evaluation with noncontrast MRI of the lumbar spine   may be   helpful (especially to better determine the age of the T12 compression   deformity).  Mild central stenosis at T12-L1, moderate L1-2 central   stenosis, moderate to severe L2-3 central stenosis, severe L3-4 and L4-5   central stenosis on a degenerative basis.

## 2019-08-06 NOTE — DISCHARGE NOTE PROVIDER - HOSPITAL COURSE
HPI: This is a 89 y/o female with a PMHx of dementia, DM, HTN, spinal stenosis presents to the ED after unwitnessed fall. Patient was just admitted and discharged from  on Friday 8/2 after evaluation for unwitnessed fall. She was sent back to [place] however on Saturday evening fell before nighttime aides arrived for duty. She resides at the [facility] with 12hr night aides for assistance.     In the ED, CT L/S revealed new acute to subacute T12 and L4 compression fractures.         Subj 8/5; Seen with daughter at bedside, c/o of "mild back pain", has not ambulated out of bed as yet. No CP/SOB.        8/6: Doesn't want to eat or answer questions, nods her head yes that she has back pain.         8/7:        ROS: stated above otherwise negative         Vital Signs Last 24 Hrs    T(C): 36.2 (06 Aug 2019 12:01), Max: 36.6 (05 Aug 2019 21:11)    T(F): 97.2 (06 Aug 2019 12:01), Max: 97.9 (05 Aug 2019 21:11)    HR: 56 (06 Aug 2019 12:01) (55 - 60)    BP: 128/49 (06 Aug 2019 12:01) (128/49 - 136/40)    BP(mean): --    RR: 16 (06 Aug 2019 12:01) (16 - 17)    SpO2: 91% (06 Aug 2019 12:01) (91% - 93%)        PHYSICAL EXAM:    Constitutional: NAD, awake however not talking right now, well-developed elderly female     HEENT: PERR, EOMI, Normal Hearing, MMM    Neck: Soft and supple, No LAD, No JVD    Respiratory: Breath sounds are clear bilaterally, No wheezing, rales or rhonchi    Cardiovascular: S1 and S2, regular rate and rhythm.    Gastrointestinal: Bowel Sounds present, soft, nontender, nondistended, no guarding, no rebound    Extremities: No peripheral edema    Vascular: 2+ peripheral pulses    Neurological: A/O x 3, no focal deficits    Musculoskeletal: 5/5 strength b/l upper and lower extremities - decreased strength 2/2 low back pain.     Skin: No rashes        All meds/ labs reviewed.         CT Lumbar Spine No Cont (08.03.19 @ 21:44) >IMPRESSION:    Acute-subacute L4 compression fracture. T12 compression fracture could     also possibly be acute-subacute. L1 compression deformity is chronic. If     clinically     indicated, further evaluation with noncontrast MRI of the lumbar spine     may be     helpful (especially to better determine the age of the T12 compression     deformity).  Mild central stenosis at T12-L1, moderate L1-2 central     stenosis, moderate to severe L2-3 central stenosis, severe L3-4 and L4-5     central stenosis on a degenerative basis.        · Assessment and Plan        # Traumatic Fall    - now with acute to subacute L4 and T12.     - discussed with Ortho: ambulate with assistance, no acute intervention.     - PT eval reports patient ambulating 250 feet --> possible dc tomorrow to [facility] if accepted vs home with 24hr aide?    - pain control with ES Tylenol.     - check vitamin D level.      - discussed extensively with daughter and SW.         # UTI - present on admission, on Ceftin to complete treatment last day is TODAY.     - no symptoms.         # Type II DM    - A1c 8.4%, holding Metformin, continue SSI AC.         # Dementia - continue home meds.         DVT ppx: heparin subQ    Code Status: DNR/DNI    Discussed with daughter and SW    DC tomorrow. HPI: This is a 89 y/o female with a PMHx of dementia, DM, HTN, spinal stenosis presents to the ED after unwitnessed fall. Patient was just admitted and discharged from  on Friday 8/2 after evaluation for unwitnessed fall. She was sent back to [place] however on Saturday evening fell before nighttime aides arrived for duty. She resides at the [facility] with 12hr night aides for assistance.     In the ED, CT L/S revealed new acute to subacute T12 and L4 compression fractures.         Subj 8/5; Seen with daughter at bedside, c/o of "mild back pain", has not ambulated out of bed as yet. No CP/SOB.        8/6: Doesn't want to eat or answer questions, nods her head yes that she has back pain.         8/7: Patient seen and examined. Lying in bed without any distress.         ROS: stated above otherwise negative         Vital Signs Last 24 Hrs    T(C): 36.5 (07 Aug 2019 11:23), Max: 36.6 (06 Aug 2019 17:07)    T(F): 97.7 (07 Aug 2019 11:23), Max: 97.9 (06 Aug 2019 17:07)    HR: 57 (07 Aug 2019 11:23) (50 - 57)    BP: 143/53 (07 Aug 2019 11:23) (120/35 - 148/52)    BP(mean): --    RR: 16 (07 Aug 2019 11:23) (16 - 16)    SpO2: 96% (07 Aug 2019 11:23) (94% - 98%)            PHYSICAL EXAM:    Constitutional: NAD, awake however not talking right now, well-developed elderly female     HEENT: PERR, EOMI, Normal Hearing, MMM    Neck: Soft and supple, No LAD, No JVD    Respiratory: Breath sounds are clear bilaterally, No wheezing, rales or rhonchi    Cardiovascular: S1 and S2, regular rate and rhythm.    Gastrointestinal: Bowel Sounds present, soft, nontender, nondistended, no guarding, no rebound    Extremities: No peripheral edema    Vascular: 2+ peripheral pulses    Neurological: A/O x 3, no focal deficits    Musculoskeletal: 5/5 strength b/l upper and lower extremities - decreased strength 2/2 low back pain.     Skin: No rashes        All meds/ labs reviewed.         CT Lumbar Spine No Cont (08.03.19 @ 21:44) >IMPRESSION:    Acute-subacute L4 compression fracture. T12 compression fracture could     also possibly be acute-subacute. L1 compression deformity is chronic. If     clinically     indicated, further evaluation with noncontrast MRI of the lumbar spine     may be     helpful (especially to better determine the age of the T12 compression     deformity).  Mild central stenosis at T12-L1, moderate L1-2 central     stenosis, moderate to severe L2-3 central stenosis, severe L3-4 and L4-5     central stenosis on a degenerative basis.        · Assessment and Plan        # Traumatic Fall    - now with acute to subacute L4 and T12.     - ambulate with assistance, no acute intervention.     - pain control with ES Tylenol.         # UTI - present on admission, on Ceftin to complete treatment last day was yesterday     - no symptoms.         # Type II DM    - A1c 8.4%,     On ISS        # Dementia - continue home meds.         DVT ppx: heparin subQ    Code Status: DNR/DNI        Home today    Spent more than 30 min to prepare the discharge.

## 2019-08-07 VITALS
RESPIRATION RATE: 16 BRPM | DIASTOLIC BLOOD PRESSURE: 53 MMHG | SYSTOLIC BLOOD PRESSURE: 143 MMHG | OXYGEN SATURATION: 96 % | TEMPERATURE: 98 F | HEART RATE: 57 BPM

## 2019-08-07 RX ADMIN — Medication 1: at 08:49

## 2019-08-07 NOTE — DISCHARGE NOTE NURSING/CASE MANAGEMENT/SOCIAL WORK - NSDCDPATPORTLINK_GEN_ALL_CORE
You can access the [facility] Patient Portal, offered by [facility], by registering with the following website: [email or web address]

## 2019-08-12 DIAGNOSIS — E78.5 HYPERLIPIDEMIA, UNSPECIFIED: ICD-10-CM

## 2019-08-12 DIAGNOSIS — N39.0 URINARY TRACT INFECTION, SITE NOT SPECIFIED: ICD-10-CM

## 2019-08-12 DIAGNOSIS — E11.9 TYPE 2 DIABETES MELLITUS WITHOUT COMPLICATIONS: ICD-10-CM

## 2019-08-12 DIAGNOSIS — R09.02 HYPOXEMIA: ICD-10-CM

## 2019-08-12 DIAGNOSIS — M48.00 SPINAL STENOSIS, SITE UNSPECIFIED: ICD-10-CM

## 2019-08-12 DIAGNOSIS — I10 ESSENTIAL (PRIMARY) HYPERTENSION: ICD-10-CM

## 2019-08-12 DIAGNOSIS — R55 SYNCOPE AND COLLAPSE: ICD-10-CM

## 2019-08-12 DIAGNOSIS — F03.90 UNSPECIFIED DEMENTIA WITHOUT BEHAVIORAL DISTURBANCE: ICD-10-CM

## 2019-08-12 DIAGNOSIS — Z66 DO NOT RESUSCITATE: ICD-10-CM

## 2019-08-12 DIAGNOSIS — B96.20 UNSPECIFIED ESCHERICHIA COLI [E. COLI] AS THE CAUSE OF DISEASES CLASSIFIED ELSEWHERE: ICD-10-CM

## 2019-08-16 DIAGNOSIS — M48.061 SPINAL STENOSIS, LUMBAR REGION WITHOUT NEUROGENIC CLAUDICATION: ICD-10-CM

## 2019-08-16 DIAGNOSIS — S32.041A STABLE BURST FRACTURE OF FOURTH LUMBAR VERTEBRA, INITIAL ENCOUNTER FOR CLOSED FRACTURE: ICD-10-CM

## 2019-08-16 DIAGNOSIS — F03.90 UNSPECIFIED DEMENTIA WITHOUT BEHAVIORAL DISTURBANCE: ICD-10-CM

## 2019-08-16 DIAGNOSIS — Z66 DO NOT RESUSCITATE: ICD-10-CM

## 2019-08-16 DIAGNOSIS — E11.9 TYPE 2 DIABETES MELLITUS WITHOUT COMPLICATIONS: ICD-10-CM

## 2019-08-16 DIAGNOSIS — G89.29 OTHER CHRONIC PAIN: ICD-10-CM

## 2019-08-16 DIAGNOSIS — M48.05 SPINAL STENOSIS, THORACOLUMBAR REGION: ICD-10-CM

## 2019-08-16 DIAGNOSIS — S22.081A STABLE BURST FRACTURE OF T11-T12 VERTEBRA, INITIAL ENCOUNTER FOR CLOSED FRACTURE: ICD-10-CM

## 2019-08-16 DIAGNOSIS — I10 ESSENTIAL (PRIMARY) HYPERTENSION: ICD-10-CM

## 2019-08-16 DIAGNOSIS — Z79.84 LONG TERM (CURRENT) USE OF ORAL HYPOGLYCEMIC DRUGS: ICD-10-CM

## 2019-08-16 DIAGNOSIS — Z79.82 LONG TERM (CURRENT) USE OF ASPIRIN: ICD-10-CM

## 2019-08-16 DIAGNOSIS — N39.0 URINARY TRACT INFECTION, SITE NOT SPECIFIED: ICD-10-CM
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
## 2020-03-28 RX ORDER — CEFUROXIME AXETIL 250 MG
1 TABLET ORAL
Qty: 0 | Refills: 0 | DISCHARGE
Start: 2020-03-28 | End: 2020-04-04

## 2020-04-06 ENCOUNTER — INPATIENT (INPATIENT)
Facility: HOSPITAL | Age: 85
LOS: 8 days | Discharge: HOSPICE | DRG: 177 | End: 2020-04-15
Attending: FAMILY MEDICINE | Admitting: FAMILY MEDICINE
Payer: MEDICARE

## 2020-04-06 VITALS
RESPIRATION RATE: 18 BRPM | SYSTOLIC BLOOD PRESSURE: 144 MMHG | DIASTOLIC BLOOD PRESSURE: 59 MMHG | OXYGEN SATURATION: 96 % | TEMPERATURE: 99 F | HEART RATE: 60 BPM

## 2020-04-06 DIAGNOSIS — R63.0 ANOREXIA: ICD-10-CM

## 2020-04-06 LAB
ALBUMIN SERPL ELPH-MCNC: 3.5 G/DL — SIGNIFICANT CHANGE UP (ref 3.3–5)
ALP SERPL-CCNC: 73 U/L — SIGNIFICANT CHANGE UP (ref 40–120)
ALT FLD-CCNC: 29 U/L — SIGNIFICANT CHANGE UP (ref 12–78)
ANION GAP SERPL CALC-SCNC: 6 MMOL/L — SIGNIFICANT CHANGE UP (ref 5–17)
AST SERPL-CCNC: 36 U/L — SIGNIFICANT CHANGE UP (ref 15–37)
BASOPHILS # BLD AUTO: 0.01 K/UL — SIGNIFICANT CHANGE UP (ref 0–0.2)
BASOPHILS NFR BLD AUTO: 0.1 % — SIGNIFICANT CHANGE UP (ref 0–2)
BILIRUB SERPL-MCNC: 0.3 MG/DL — SIGNIFICANT CHANGE UP (ref 0.2–1.2)
BUN SERPL-MCNC: 42 MG/DL — HIGH (ref 7–23)
CALCIUM SERPL-MCNC: 9.5 MG/DL — SIGNIFICANT CHANGE UP (ref 8.5–10.1)
CHLORIDE SERPL-SCNC: 106 MMOL/L — SIGNIFICANT CHANGE UP (ref 96–108)
CO2 SERPL-SCNC: 28 MMOL/L — SIGNIFICANT CHANGE UP (ref 22–31)
CREAT SERPL-MCNC: 1.58 MG/DL — HIGH (ref 0.5–1.3)
EOSINOPHIL # BLD AUTO: 0 K/UL — SIGNIFICANT CHANGE UP (ref 0–0.5)
EOSINOPHIL NFR BLD AUTO: 0 % — SIGNIFICANT CHANGE UP (ref 0–6)
GLUCOSE SERPL-MCNC: 240 MG/DL — HIGH (ref 70–99)
HCT VFR BLD CALC: 37.3 % — SIGNIFICANT CHANGE UP (ref 34.5–45)
HGB BLD-MCNC: 11.8 G/DL — SIGNIFICANT CHANGE UP (ref 11.5–15.5)
IMM GRANULOCYTES NFR BLD AUTO: 0.4 % — SIGNIFICANT CHANGE UP (ref 0–1.5)
LYMPHOCYTES # BLD AUTO: 1.5 K/UL — SIGNIFICANT CHANGE UP (ref 1–3.3)
LYMPHOCYTES # BLD AUTO: 20.7 % — SIGNIFICANT CHANGE UP (ref 13–44)
MCHC RBC-ENTMCNC: 30.4 PG — SIGNIFICANT CHANGE UP (ref 27–34)
MCHC RBC-ENTMCNC: 31.6 GM/DL — LOW (ref 32–36)
MCV RBC AUTO: 96.1 FL — SIGNIFICANT CHANGE UP (ref 80–100)
MONOCYTES # BLD AUTO: 1.13 K/UL — HIGH (ref 0–0.9)
MONOCYTES NFR BLD AUTO: 15.6 % — HIGH (ref 2–14)
NEUTROPHILS # BLD AUTO: 4.58 K/UL — SIGNIFICANT CHANGE UP (ref 1.8–7.4)
NEUTROPHILS NFR BLD AUTO: 63.2 % — SIGNIFICANT CHANGE UP (ref 43–77)
PLATELET # BLD AUTO: 153 K/UL — SIGNIFICANT CHANGE UP (ref 150–400)
POTASSIUM SERPL-MCNC: 4.8 MMOL/L — SIGNIFICANT CHANGE UP (ref 3.5–5.3)
POTASSIUM SERPL-SCNC: 4.8 MMOL/L — SIGNIFICANT CHANGE UP (ref 3.5–5.3)
PROT SERPL-MCNC: 8.4 GM/DL — HIGH (ref 6–8.3)
RBC # BLD: 3.88 M/UL — SIGNIFICANT CHANGE UP (ref 3.8–5.2)
RBC # FLD: 12 % — SIGNIFICANT CHANGE UP (ref 10.3–14.5)
SODIUM SERPL-SCNC: 140 MMOL/L — SIGNIFICANT CHANGE UP (ref 135–145)
TROPONIN I SERPL-MCNC: 0.02 NG/ML — SIGNIFICANT CHANGE UP (ref 0.01–0.04)
WBC # BLD: 7.25 K/UL — SIGNIFICANT CHANGE UP (ref 3.8–10.5)
WBC # FLD AUTO: 7.25 K/UL — SIGNIFICANT CHANGE UP (ref 3.8–10.5)

## 2020-04-06 PROCEDURE — 82040 ASSAY OF SERUM ALBUMIN: CPT

## 2020-04-06 PROCEDURE — 82746 ASSAY OF FOLIC ACID SERUM: CPT

## 2020-04-06 PROCEDURE — 84145 PROCALCITONIN (PCT): CPT

## 2020-04-06 PROCEDURE — 80053 COMPREHEN METABOLIC PANEL: CPT

## 2020-04-06 PROCEDURE — 97116 GAIT TRAINING THERAPY: CPT | Mod: GP

## 2020-04-06 PROCEDURE — 85027 COMPLETE CBC AUTOMATED: CPT

## 2020-04-06 PROCEDURE — 85379 FIBRIN DEGRADATION QUANT: CPT

## 2020-04-06 PROCEDURE — 82607 VITAMIN B-12: CPT

## 2020-04-06 PROCEDURE — 36415 COLL VENOUS BLD VENIPUNCTURE: CPT

## 2020-04-06 PROCEDURE — 83735 ASSAY OF MAGNESIUM: CPT

## 2020-04-06 PROCEDURE — 82728 ASSAY OF FERRITIN: CPT

## 2020-04-06 PROCEDURE — 83036 HEMOGLOBIN GLYCOSYLATED A1C: CPT

## 2020-04-06 PROCEDURE — 99223 1ST HOSP IP/OBS HIGH 75: CPT | Mod: AI

## 2020-04-06 PROCEDURE — 97535 SELF CARE MNGMENT TRAINING: CPT | Mod: GP

## 2020-04-06 PROCEDURE — 84100 ASSAY OF PHOSPHORUS: CPT

## 2020-04-06 PROCEDURE — 97530 THERAPEUTIC ACTIVITIES: CPT | Mod: GP

## 2020-04-06 PROCEDURE — 82962 GLUCOSE BLOOD TEST: CPT

## 2020-04-06 PROCEDURE — 82550 ASSAY OF CK (CPK): CPT

## 2020-04-06 PROCEDURE — 93010 ELECTROCARDIOGRAM REPORT: CPT

## 2020-04-06 PROCEDURE — 86140 C-REACTIVE PROTEIN: CPT

## 2020-04-06 PROCEDURE — 97162 PT EVAL MOD COMPLEX 30 MIN: CPT | Mod: GP

## 2020-04-06 PROCEDURE — 83615 LACTATE (LD) (LDH) ENZYME: CPT

## 2020-04-06 PROCEDURE — 84443 ASSAY THYROID STIM HORMONE: CPT

## 2020-04-06 PROCEDURE — 80048 BASIC METABOLIC PNL TOTAL CA: CPT

## 2020-04-06 PROCEDURE — 71045 X-RAY EXAM CHEST 1 VIEW: CPT | Mod: 26

## 2020-04-06 RX ORDER — METFORMIN HYDROCHLORIDE 850 MG/1
1 TABLET ORAL
Qty: 0 | Refills: 0 | DISCHARGE

## 2020-04-06 RX ORDER — HYDROXYCHLOROQUINE SULFATE 200 MG
400 TABLET ORAL EVERY 12 HOURS
Refills: 0 | Status: COMPLETED | OUTPATIENT
Start: 2020-04-06 | End: 2020-04-06

## 2020-04-06 RX ORDER — HYDROXYCHLOROQUINE SULFATE 200 MG
200 TABLET ORAL EVERY 12 HOURS
Refills: 0 | Status: COMPLETED | OUTPATIENT
Start: 2020-04-07 | End: 2020-04-10

## 2020-04-06 RX ORDER — LANOLIN ALCOHOL/MO/W.PET/CERES
1 CREAM (GRAM) TOPICAL
Qty: 0 | Refills: 0 | DISCHARGE

## 2020-04-06 RX ORDER — MIRTAZAPINE 45 MG/1
1 TABLET, ORALLY DISINTEGRATING ORAL
Qty: 0 | Refills: 0 | DISCHARGE

## 2020-04-06 RX ORDER — CHOLECALCIFEROL (VITAMIN D3) 125 MCG
1 CAPSULE ORAL
Qty: 0 | Refills: 0 | DISCHARGE

## 2020-04-06 RX ORDER — CEFTRIAXONE 500 MG/1
1000 INJECTION, POWDER, FOR SOLUTION INTRAMUSCULAR; INTRAVENOUS ONCE
Refills: 0 | Status: COMPLETED | OUTPATIENT
Start: 2020-04-06 | End: 2020-04-06

## 2020-04-06 RX ORDER — HYDROXYCHLOROQUINE SULFATE 200 MG
TABLET ORAL
Refills: 0 | Status: COMPLETED | OUTPATIENT
Start: 2020-04-06 | End: 2020-04-10

## 2020-04-06 RX ADMIN — CEFTRIAXONE 1000 MILLIGRAM(S): 500 INJECTION, POWDER, FOR SOLUTION INTRAMUSCULAR; INTRAVENOUS at 19:12

## 2020-04-06 RX ADMIN — Medication 400 MILLIGRAM(S): at 19:45

## 2020-04-06 NOTE — H&P ADULT - BACK
-on atenolol and lisinopril at home  -holding in the s/o acute GIB  -continue to monitor BPs detailed exam

## 2020-04-06 NOTE — ED ADULT NURSE REASSESSMENT NOTE - NS ED NURSE REASSESS COMMENT FT1
PT on isolation for covid 19. pt a&ox1, confused. Pt pulls mask and oxygenation off at times. Frequent rounds done on pt. Pt vss, 02 sat 98%. Will continue to monitor and reassess. PT remains safe and comfortable in close range to nursing station.

## 2020-04-06 NOTE — ED ADULT TRIAGE NOTE - CHIEF COMPLAINT QUOTE
Patient is COVID positive sent from [facility] for lethargy and cough. Patient is COVID positive sent from [facility] for lethargy and poor appetite.

## 2020-04-06 NOTE — ED PROVIDER NOTE - PROGRESS NOTE DETAILS
Janae MCKEON: spoke with patient son in law and daughter who are emergency contact of patient. Informed them that patient is not hypoxic in Ed and is comfortable. Pt is demented and I cannot illicit pain no do her vital seem alarming to me. Family very understanding and informed that patient "may need IV fluids". I told family that with her vitals appearing well and BP wnl there would be no emergent need for IVF. Family agreeable to that and wants her back at [facility]. Shalini DOSHI for ED attending, Dr. Cardoso : Spoke w/ pt daughter and updated about plan to workup pt for hypoxia given a drop in O2 sat from 96-98 % on room air to 89-90% on room air. Family agreeable to plan w/ likely admission.

## 2020-04-06 NOTE — H&P ADULT - NSHPPHYSICALEXAM_GEN_ALL_CORE
ICU Vital Signs Last 24 Hrs    T(F): 99 (06 Apr 2020 17:47), Max: 99 (06 Apr 2020 15:05)  HR: 59 (06 Apr 2020 17:47) (59 - 60)  BP: 152/64 (06 Apr 2020 17:47) (144/59 - 152/64)    RR: 20 (06 Apr 2020 17:47) (18 - 20)  SpO2: 96% (06 Apr 2020 17:47) (89% - 96%)

## 2020-04-06 NOTE — ED PROVIDER NOTE - OBJECTIVE STATEMENT
PT comes to the ED sent by facility for weakness and not eating. No fevers no n/v or diarrhea. Pt appears well. Limited information as patient has hx of dementia.

## 2020-04-06 NOTE — ED PROVIDER NOTE - NS_ ATTENDINGSCRIBEDETAILS _ED_A_ED_FT
I, Emiliano Cardoso MD,  performed the initial face to face bedside interview with this patient regarding history of present illness, review of symptoms and relevant past medical, social and family history.  I completed an independent physical examination.    The history, relevant review of systems, past medical and surgical history, medical decision making, and physical examination was documented by the scribe in my presence and I attest to the accuracy of the documentation.

## 2020-04-06 NOTE — H&P ADULT - ASSESSMENT
Pt is admitted w/    COVID  RLL Pna  Hypoxia  Decreased PO  CKD III  DM  - admit to med  - O2 support  - s/p   - s/p   - hold metformin  - Insulin sliding    - DVT prphylaxis heparin  - IMPROVE VTE Individual Risk Assessment    RISK                                                                Points    [  ] Previous VTE                                                  3    [  ] Thrombophilia                                               2    [  ] Lower limb paralysis                                      2        (unable to hold up >15 seconds)      [  ] Current Cancer                                              2         (within 6 months)    [X  ] Immobilization > 24 hrs                                1    [  ] ICU/CCU stay > 24 hours                              1    [ X ] Age > 60                                                      1    IMPROVE VTE Score _____2____    IMPROVE Score 0-1: Low Risk, No VTE prophylaxis required for most patients, encourage ambulation.   IMPROVE Score 2-3: At risk, pharmacologic VTE prophylaxis is indicated for most patients (in the absence of a contraindication)  IMPROVE Score > or = 4: High Risk, pharmacologic VTE prophylaxis is indicated for most patients (in the absence of a contraindication) Pt is admitted w/    COVID  RLL Pna  Hypoxia  Decreased PO  CKD III  DM  - admit to med  - O2 support, cont  - s/p   - hold metformin  - Insulin sliding    Adv Directives Full Code  - DVT prphylaxis heparin  - IMPROVE VTE Individual Risk Assessment    RISK                                                                Points    [  ] Previous VTE                                                  3    [  ] Thrombophilia                                               2    [  ] Lower limb paralysis                                      2        (unable to hold up >15 seconds)      [  ] Current Cancer                                              2         (within 6 months)    [X  ] Immobilization > 24 hrs                                1    [  ] ICU/CCU stay > 24 hours                              1    [ X ] Age > 60                                                      1    IMPROVE VTE Score _____2____    IMPROVE Score 0-1: Low Risk, No VTE prophylaxis required for most patients, encourage ambulation.   IMPROVE Score 2-3: At risk, pharmacologic VTE prophylaxis is indicated for most patients (in the absence of a contraindication)  IMPROVE Score > or = 4: High Risk, pharmacologic VTE prophylaxis is indicated for most patients (in the absence of a contraindication) Pt is admitted w/    COVID  RLL Pna  Hypoxia  Decreased PO  CKD III  DM  Chronic UTI  - admit to med  - O2 support, cont  - s/p plaquenil, will cont  - s/p 1 dose of Rocephin  - hold metformin  - Insulin sliding    Adv Directives FullCode  - DVT prphylaxis heparin  - IMPROVE VTE Individual Risk Assessment    RISK                                                                Points    [  ] Previous VTE                                                  3    [  ] Thrombophilia                                               2    [  ] Lower limb paralysis                                      2        (unable to hold up >15 seconds)      [  ] Current Cancer                                              2         (within 6 months)    [X  ] Immobilization > 24 hrs                                1    [  ] ICU/CCU stay > 24 hours                              1    [ X ] Age > 60                                                      1    IMPROVE VTE Score _____2____    IMPROVE Score 0-1: Low Risk, No VTE prophylaxis required for most patients, encourage ambulation.   IMPROVE Score 2-3: At risk, pharmacologic VTE prophylaxis is indicated for most patients (in the absence of a contraindication)  IMPROVE Score > or = 4: High Risk, pharmacologic VTE prophylaxis is indicated for most patients (in the absence of a contraindication)

## 2020-04-06 NOTE — ED ADULT NURSE NOTE - NSFALLRSKPASTHIST_ED_ALL_ED
Call me in 1 week for follow up.    Patient Education     Headache, Unspecified    Headaches can be caused by a number of things. The cause of your headache isn’t clear. But it doesn’t seem to be a sign of any serious illness.  You could have a tension headache or a migraine headache.  Stress can cause a tension headache. This can happen if you tense the muscles of your shoulders, neck, and scalp without knowing it. If this stress lasts long enough, you may develop a tension headache.  It is not clear why migraines occur, but transient factors called\" triggers\" can raise the risk of having a migraine attack. Migraine triggers may include emotional stress or depression, or by hormone changes during the menstrual cycle. Other triggers include birth control pills and other medicines, alcohol or caffeine, foods with tyramine, eye strain, weather changes, missed meals, and lack of sleep or oversleeping.  Other causes of headache include:  · Viral illness with high fever  · Head injury with concussion  · Sinus, ear, or throat infection  · Dental pain and jaw joint (TMJ) pain  More serious but less common causes of headache include stroke, brain hemorrhage, brain tumor, meningitis, and encephalitis.  Home care  Follow these tips when taking care of yourself at home:  · Don’t drive yourself home if you were given pain medicine for your headache. Instead, have someone else drive you home. Try to sleep when you get home. You should feel much better when you wake up.  · Apply heat to the back of your neck to ease a neck muscle spasm. Take care of a migraine headache by putting an ice pack on your forehead or at the base of your skull.  · If you have nausea or vomiting, eat a light diet until your headache eases.  · If you have a migraine headache, use sunglasses when in the daylight or around bright indoor lighting until your symptoms get better. Bright glaring light can make this type of headache worse.  Follow-up  care  Follow up with your health care provider if your headache doesn’t get better within the next 24 hours. Talk with your provider If you have frequent headaches. He or she can help figure out a treatment plan. By knowing the earliest signs of headache, and starting treatment right away, you may be able to stop the pain yourself.  When to seek medical advice  Call your health care provider right away if any of these occur:  · Your head pain gets worse  · Your head pain doesn’t get better within 24 hours  · You aren’t able to keep liquids down (repeated vomiting)  · Fever of 100.4ºF (38ºC) or higher, or as directed by your health care provider  · Stiff neck  · Extreme drowsiness, confusion, or fainting  · Dizziness or dizziness with spinning sensation (vertigo)  · Weakness in an arm or leg or one side of your face  · You have difficulty talking or seeing  © 0110-1574 [employer] ([employer]). [address] [place] [address], [place] [address]. All rights reserved. This information is not intended as a substitute for professional medical care. Always follow your healthcare professional's instructions.            unable to determine

## 2020-04-06 NOTE — ED ADULT NURSE NOTE - NSIMPLEMENTINTERV_GEN_ALL_ED
Implemented All Fall Risk Interventions:  [place] to call system. Call bell, personal items and telephone within reach. Instruct patient to call for assistance. Room bathroom lighting operational. Non-slip footwear when patient is off stretcher. Physically safe environment: no spills, clutter or unnecessary equipment. Stretcher in lowest position, wheels locked, appropriate side rails in place. Provide visual cue, wrist band, yellow gown, etc. Monitor gait and stability. Monitor for mental status changes and reorient to person, place, and time. Review medications for side effects contributing to fall risk. Reinforce activity limits and safety measures with patient and family.

## 2020-04-06 NOTE — ED PROVIDER NOTE - PATIENT PORTAL LINK FT
You can access the FollowMyHealth Patient Portal offered by [facility] by registering at the following website: [email or web address]. By joining [facility]’s FollowMyHealth portal, you will also be able to view your health information using other applications (apps) compatible with our system.

## 2020-04-06 NOTE — H&P ADULT - NSTOBACCOSCREENHP_GEN_A_CS
No

## 2020-04-06 NOTE — ED ADULT NURSE NOTE - OBJECTIVE STATEMENT
pt to ed from [facility] for weakness, lethargy and cough. pt is a known covid + as per Amanda @ [facility]. pT 02 SAT ON RA 89%. PT CONFUSED AT BASELINE, ALERT AND RESPONSIVE. RR 22. NO DISTRESS. SKIN WARM AND PINK. WILL CONTINUE TO MONITOR.

## 2020-04-06 NOTE — H&P ADULT - HISTORY OF PRESENT ILLNESS
89 y/o F from [facility]  presenting with decreased po intake and found to have Bilat Pna, with  hypoxia including a drop in O2 sat from 96-98 % on room air to 89-90% on room air. Pt is a 89 y/o F from [facility] with PMHx DM, CKD III, chronic UTI on supression with bactrim,  Depression, HTN,  L hip fx who  presents with decreased po intake and found to have Bilat Pna.    Pt has   hypoxia on room air to 89-90%.        Surg Hx:   L hip fx    Fam Hx:  unavialable to to pts being a poor historian.

## 2020-04-07 DIAGNOSIS — N39.0 URINARY TRACT INFECTION, SITE NOT SPECIFIED: ICD-10-CM

## 2020-04-07 DIAGNOSIS — N17.9 ACUTE KIDNEY FAILURE, UNSPECIFIED: ICD-10-CM

## 2020-04-07 DIAGNOSIS — E11.9 TYPE 2 DIABETES MELLITUS WITHOUT COMPLICATIONS: ICD-10-CM

## 2020-04-07 DIAGNOSIS — U07.1 COVID-19: ICD-10-CM

## 2020-04-07 DIAGNOSIS — Z29.9 ENCOUNTER FOR PROPHYLACTIC MEASURES, UNSPECIFIED: ICD-10-CM

## 2020-04-07 DIAGNOSIS — Z66 DO NOT RESUSCITATE: ICD-10-CM

## 2020-04-07 DIAGNOSIS — J12.9 VIRAL PNEUMONIA, UNSPECIFIED: ICD-10-CM

## 2020-04-07 DIAGNOSIS — F03.90 UNSPECIFIED DEMENTIA WITHOUT BEHAVIORAL DISTURBANCE: ICD-10-CM

## 2020-04-07 DIAGNOSIS — J96.01 ACUTE RESPIRATORY FAILURE WITH HYPOXIA: ICD-10-CM

## 2020-04-07 LAB — SARS-COV-2 RNA SPEC QL NAA+PROBE: DETECTED

## 2020-04-07 PROCEDURE — 99233 SBSQ HOSP IP/OBS HIGH 50: CPT | Mod: GC

## 2020-04-07 RX ORDER — DEXTROSE 50 % IN WATER 50 %
12.5 SYRINGE (ML) INTRAVENOUS ONCE
Refills: 0 | Status: DISCONTINUED | OUTPATIENT
Start: 2020-04-07 | End: 2020-04-15

## 2020-04-07 RX ORDER — INSULIN LISPRO 100/ML
VIAL (ML) SUBCUTANEOUS
Refills: 0 | Status: DISCONTINUED | OUTPATIENT
Start: 2020-04-07 | End: 2020-04-09

## 2020-04-07 RX ORDER — SODIUM CHLORIDE 9 MG/ML
1000 INJECTION, SOLUTION INTRAVENOUS
Refills: 0 | Status: DISCONTINUED | OUTPATIENT
Start: 2020-04-07 | End: 2020-04-13

## 2020-04-07 RX ORDER — DEXTROSE 50 % IN WATER 50 %
25 SYRINGE (ML) INTRAVENOUS ONCE
Refills: 0 | Status: DISCONTINUED | OUTPATIENT
Start: 2020-04-07 | End: 2020-04-15

## 2020-04-07 RX ORDER — MEMANTINE HYDROCHLORIDE 10 MG/1
10 TABLET ORAL
Refills: 0 | Status: DISCONTINUED | OUTPATIENT
Start: 2020-04-07 | End: 2020-04-14

## 2020-04-07 RX ORDER — ASPIRIN/CALCIUM CARB/MAGNESIUM 324 MG
81 TABLET ORAL DAILY
Refills: 0 | Status: DISCONTINUED | OUTPATIENT
Start: 2020-04-07 | End: 2020-04-14

## 2020-04-07 RX ORDER — ALBUTEROL 90 UG/1
2 AEROSOL, METERED ORAL EVERY 4 HOURS
Refills: 0 | Status: DISCONTINUED | OUTPATIENT
Start: 2020-04-07 | End: 2020-04-15

## 2020-04-07 RX ORDER — INSULIN LISPRO 100/ML
VIAL (ML) SUBCUTANEOUS AT BEDTIME
Refills: 0 | Status: DISCONTINUED | OUTPATIENT
Start: 2020-04-07 | End: 2020-04-14

## 2020-04-07 RX ORDER — METFORMIN HYDROCHLORIDE 850 MG/1
500 TABLET ORAL AT BEDTIME
Refills: 0 | Status: DISCONTINUED | OUTPATIENT
Start: 2020-04-07 | End: 2020-04-07

## 2020-04-07 RX ORDER — HEPARIN SODIUM 5000 [USP'U]/ML
5000 INJECTION INTRAVENOUS; SUBCUTANEOUS EVERY 12 HOURS
Refills: 0 | Status: DISCONTINUED | OUTPATIENT
Start: 2020-04-07 | End: 2020-04-14

## 2020-04-07 RX ORDER — DEXTROSE 50 % IN WATER 50 %
15 SYRINGE (ML) INTRAVENOUS ONCE
Refills: 0 | Status: DISCONTINUED | OUTPATIENT
Start: 2020-04-07 | End: 2020-04-15

## 2020-04-07 RX ORDER — PETROLATUM,WHITE
1 JELLY (GRAM) TOPICAL AT BEDTIME
Refills: 0 | Status: DISCONTINUED | OUTPATIENT
Start: 2020-04-07 | End: 2020-04-15

## 2020-04-07 RX ORDER — ENOXAPARIN SODIUM 100 MG/ML
30 INJECTION SUBCUTANEOUS DAILY
Refills: 0 | Status: DISCONTINUED | OUTPATIENT
Start: 2020-04-07 | End: 2020-04-07

## 2020-04-07 RX ORDER — ACETAMINOPHEN 500 MG
650 TABLET ORAL EVERY 4 HOURS
Refills: 0 | Status: DISCONTINUED | OUTPATIENT
Start: 2020-04-07 | End: 2020-04-14

## 2020-04-07 RX ORDER — QUETIAPINE FUMARATE 200 MG/1
25 TABLET, FILM COATED ORAL DAILY
Refills: 0 | Status: DISCONTINUED | OUTPATIENT
Start: 2020-04-07 | End: 2020-04-14

## 2020-04-07 RX ORDER — BUPROPION HYDROCHLORIDE 150 MG/1
150 TABLET, EXTENDED RELEASE ORAL DAILY
Refills: 0 | Status: DISCONTINUED | OUTPATIENT
Start: 2020-04-07 | End: 2020-04-14

## 2020-04-07 RX ORDER — ACETAMINOPHEN 500 MG
650 TABLET ORAL EVERY 6 HOURS
Refills: 0 | Status: DISCONTINUED | OUTPATIENT
Start: 2020-04-07 | End: 2020-04-15

## 2020-04-07 RX ORDER — GLUCAGON INJECTION, SOLUTION 0.5 MG/.1ML
1 INJECTION, SOLUTION SUBCUTANEOUS ONCE
Refills: 0 | Status: DISCONTINUED | OUTPATIENT
Start: 2020-04-07 | End: 2020-04-15

## 2020-04-07 RX ADMIN — Medication 2: at 12:58

## 2020-04-07 RX ADMIN — Medication 1 APPLICATION(S): at 22:26

## 2020-04-07 RX ADMIN — Medication 650 MILLIGRAM(S): at 22:29

## 2020-04-07 RX ADMIN — ENOXAPARIN SODIUM 30 MILLIGRAM(S): 100 INJECTION SUBCUTANEOUS at 12:58

## 2020-04-07 RX ADMIN — Medication 1: at 17:11

## 2020-04-07 NOTE — PHYSICAL THERAPY INITIAL EVALUATION ADULT - GENERAL OBSERVATIONS, REHAB EVAL
supine in bed,resting but arousable to verbal/tactile stimulii; tongue with white coat,appears DRY oral mucosa/lips ,non-verbal on this encounter and treportedly at baseline but able to follow simple commands

## 2020-04-07 NOTE — ED ADULT NURSE REASSESSMENT NOTE - NS ED NURSE REASSESS COMMENT FT1
pt sleeping comfortably in bed. no complaints at this time. awaiting bed upstairs. will continue to monitor.

## 2020-04-07 NOTE — ED ADULT NURSE REASSESSMENT NOTE - NS ED NURSE REASSESS COMMENT FT1
pt alert and confused. pt continue to pulls mask off of face. 3LNC in place , 02 SAT 96% .Pt incontinent of urine. Pt changed and positioned in bed. PT awaiting bed on unit, resting safe and comfortable in close range to nursing station. Fall precautions in place. Will continue to monitor and reassess.

## 2020-04-07 NOTE — PROGRESS NOTE ADULT - PROBLEM SELECTOR PLAN 7
- DNR/DNI  - MOLST form in chart - lovenox DVT ppx - will discontinue lovenox due to YOHANA  - Will start heparin subq

## 2020-04-07 NOTE — ED ADULT NURSE REASSESSMENT NOTE - NS ED NURSE REASSESS COMMENT FT1
Report received from [facility] [place]. pt A&O x1, at baseline mental status and resting comfortably in bed. pt on 3L NC, O2 sat 95-98%. safety maintained, will continue to monitor.

## 2020-04-07 NOTE — PROGRESS NOTE ADULT - PROBLEM SELECTOR PROBLEM 6
"Physical Therapy  Evaluation    Juliane Ramos   MRN: [id number]   Admitting Diagnosis: Acute respiratory failure with hypoxia and hypercapnia    PT Received On: 12/03/17  PT Start Time: 1200     PT Stop Time: 1225    PT Total Time (min): 25 min       PT Start Time: 1345  PT End Time: 1400  PT Total Time: 15 min    Billable Minutes:  Evaluation 25 and Therapeutic Activity 15    Diagnosis: Acute respiratory failure with hypoxia and hypercapnia      Past Medical History:   Diagnosis Date    Anemia in stage 3 chronic kidney disease 11/26/2017    CHF (congestive heart failure)     COPD (chronic obstructive pulmonary disease)     Coronary artery disease     Diabetes mellitus     Encounter for blood transfusion     Essential hypertension 6/24/2017    Hypertension     MI (myocardial infarction) 2010    Segmental and subsegmental pulmonary emboli of RLL without acute cor pulmonale 11/25/2017    Stroke     July 2005    Thyroid disease     Traumatic subarachnoid hemorrhage 11/24/2017    Type 2 diabetes mellitus with stage 3 chronic kidney disease, without long-term current use of insulin 6/24/2017      Past Surgical History:   Procedure Laterality Date    ABCESS DRAINAGE Right     Between "little toe" and the one next to it    BREAST SURGERY      Reduction [id number]    CARDIAC SURGERY  01/2011    CABG 4vessel ring in one valve mitral valve prolapse    CORONARY ARTERY BYPASS GRAFT      hyperlipidemia      TUBAL LIGATION  03/1986       Referring physician: Dr. Hui  Date referred to PT: 12/2/17    General Precautions: Standard, fall  Orthopedic Precautions: LLE non weight bearing   Braces:         Do you have any cultural, spiritual, Hindu conflicts, given your current situation?: none    Patient History:  Lives With: spouse  Living Arrangements: mobile home  Home Accessibility: stairs to enter home  Number of Stairs to Enter Home: 4  Stair Railings at Home: outside, present at both sides  Transportation " Available: family or friend will provide  Living Environment Comment: Pt lives with spouse in mobile home with 4 JEFF, bilateral HR. Pt states that she ambulates independently at home, but does use rollator for community mobility.  is home and able to care for her 24/7.   Equipment Currently Used at Home: shower chair, rollator, bedside commode, grab bar  DME owned (not currently used): rolling walker and single point cane    Previous Level of Function:  Ambulation Skills: needs device  Transfer Skills: needs device  ADL Skills: needs device  Work/Leisure Activity: needs device    Subjective:  Communicated with primary nurse Vale and Charge nurse Laxmi prior to session. Pt is off bipap and ready to sit OOB. Aspirated this morning.      Chief Complaint: Weakness  Patient goals: go home    Pain/Comfort  Pain Rating 1: 0/10      Objective:   Patient found with: oxygen, central line, villegas catheter, telemetry, pulse ox (continuous)     Cognitive Exam:  Oriented to: Person, Place and Situation    Follows Commands/attention: Follows multistep  commands  Communication: clear/fluent  Safety awareness/insight to disability: intact    Physical Exam:  Postural examination/scapula alignment: Rounded shoulder and Head forward    Lower Extremity Range of Motion:  Right Lower Extremity: WFL  Left Lower Extremity: WFL passively    Lower Extremity Strength:  Right Lower Extremity: WFL  Left Lower Extremity: grossly 2+/5       Functional Mobility:  Bed Mobility:  Rolling/Turning to Left: Maximum assistance  Supine to Sit: Maximum Assistance  Sit to Supine: Maximum Assistance    Transfers:  Sit <> Stand Assistance: Moderate Assistance  Sit <> Stand Assistive Device: No Assistive Device  Bed <> Chair Technique: Stand Pivot  Bed <> Chair Assistance: Moderate Assistance  Bed <> Chair Assistive Device: No Assistive Device  Chair<> Mat Technique: Stand Pivot    Gait:            Therapeutic Activities and Exercises:  Pt needing max  A for bed mobility. Once seated EOB, pt initially max A to maintain balance, but with verbal/tactile cues pt able to sit at CGA, not able to take challenges. Pt transferring mod A to bedside chair, NWB on LLE. Pt up in chair 1.5 hrs and then assisted back to bed at mod A.     AM-PAC 6 CLICK MOBILITY  How much help from another person does this patient currently need?   1 = Unable, Total/Dependent Assistance  2 = A lot, Maximum/Moderate Assistance  3 = A little, Minimum/Contact Guard/Supervision  4 = None, Modified [place]/Independent          AM-PAC Raw Score CMS G-Code Modifier Level of Impairment Assistance   6 % Total / Unable   7 - 9 CM 80 - 100% Maximal Assist   10 - 14 CL 60 - 80% Moderate Assist   15 - 19 CK 40 - 60% Moderate Assist   20 - 22 CJ 20 - 40% Minimal Assist   23 CI 1-20% SBA / CGA   24 CH 0% Independent/ Mod I     Patient left supine with all lines intact, call button in reach and primary nurse and spouse present.    Assessment:   Juliane Ramos is a 55 y.o. female with a medical diagnosis of Acute respiratory failure with hypoxia and hypercapnia and presents with decreased functional mobility. Patient may benefit from continued skilled PT to address noted impairments and improve functional mobility.    Rehab identified problem list/impairments: Rehab identified problem list/impairments: weakness, impaired endurance, impaired functional mobilty, gait instability, decreased lower extremity function, orthopedic precautions, decreased safety awareness, impaired balance, impaired self care skills    Rehab potential is good.    Activity tolerance: Good    Discharge recommendations: Discharge Facility/Level Of Care Needs: nursing facility, skilled     Barriers to discharge: Barriers to Discharge: Inaccessible home environment    Equipment recommendations: Equipment Needed After Discharge: other (see comments) (pt requesting hospital bed, as she slept better in her 's hospital bed  than in her lift chair--also wants trapeze bar)     GOALS:    Physical Therapy Goals        Problem: Physical Therapy Goal    Goal Priority Disciplines Outcome Goal Variances Interventions   Physical Therapy Goal     PT/OT, PT Ongoing (interventions implemented as appropriate)     Description:  Goals to be met by: 2017     Patient will increase functional independence with mobility by performin. Supine to sit with Minimal Assistance  2. Sit to supine with Minimal Assistance  3. Sit to stand transfer with Minimal Assistance  4. Bed to chair transfer with Minimal Assistance using Rolling Walker  5. Stand for 5 minutes with Stand-by Assistance using Rolling Walker  6. Lower extremity exercise program x10 reps, with supervision  Wheelchair management goal to be made pending progress                       PLAN:    Patient to be seen 6 x/week to address the above listed problems via gait training, therapeutic activities, therapeutic exercises, therapeutic groups, wheelchair management/training  Plan of Care expires: 17  Plan of Care reviewed with: patient          Ruth Ann Darci, PT  2017       Prophylactic measure Chronic UTI (urinary tract infection)

## 2020-04-07 NOTE — PHYSICAL THERAPY INITIAL EVALUATION ADULT - CRITERIA FOR SKILLED THERAPEUTIC INTERVENTIONS
risk reduction/prevention/impairments found/rehab potential/functional limitations in following categories/predicted duration of therapy intervention/therapy frequency/anticipated equipment needs at discharge/anticipated discharge recommendation

## 2020-04-07 NOTE — PROGRESS NOTE ADULT - PROBLEM SELECTOR PLAN 2
- COVID-19 PCR positive  - management as above  - f/u blood cultures to eval for secondary bacterial infections - COVID-19 PCR positive  - management as above  - f/u blood cultures to eval for secondary bacterial infections  - f/u inflammatory markers

## 2020-04-07 NOTE — PROGRESS NOTE ADULT - PROBLEM SELECTOR PROBLEM 7
Advance directive indicates patient wish for do-not-intubate resuscitation status Prophylactic measure

## 2020-04-07 NOTE — PROGRESS NOTE ADULT - SUBJECTIVE AND OBJECTIVE BOX
History of Present Illness: 	  89 y/o F from [facility]  presenting with decreased po intake and found to have Bilat Pna, with  hypoxia including a drop in O2 sat from 96-98 % on room air to 89-90% on room air.      4/7: Patient evaluated at bedside. Unable to provide history due to underlying dementia and patient is non vocal, but she seems to be at baseline per HCP Stacey Garza who I spoke with over the phone. Currently seems comfortable O2 sat is 99% on RA. In addition, spoke with HCP about code status, she wishes for no heroic measures, patient is now DNR/DNI.     REVIEW OF SYSTEMS: unable to obtain due to advance dementia    MEDICATIONS  (STANDING):  AQUAPHOR (petrolatum Ointment) 1 Application(s) Topical at bedtime  aspirin enteric coated 81 milliGRAM(s) Oral daily  buPROPion XL . 150 milliGRAM(s) Oral daily  dextrose 5%. 1000 milliLiter(s) (50 mL/Hr) IV Continuous <Continuous>  dextrose 50% Injectable 12.5 Gram(s) IV Push once  dextrose 50% Injectable 25 Gram(s) IV Push once  dextrose 50% Injectable 25 Gram(s) IV Push once  enoxaparin Injectable 30 milliGRAM(s) SubCutaneous daily  hydroxychloroquine   Oral   hydroxychloroquine 200 milliGRAM(s) Oral every 12 hours  insulin lispro (HumaLOG) corrective regimen sliding scale   SubCutaneous three times a day before meals  insulin lispro (HumaLOG) corrective regimen sliding scale   SubCutaneous at bedtime  memantine 10 milliGRAM(s) Oral two times a day  QUEtiapine 25 milliGRAM(s) Oral daily  trimethoprim   80 mG/sulfamethoxazole 400 mG 1 Tablet(s) Oral daily    MEDICATIONS  (PRN):  acetaminophen   Tablet .. 650 milliGRAM(s) Oral every 4 hours PRN Temp greater or equal to 38.5C (101.3F)  acetaminophen  Suppository .. 650 milliGRAM(s) Rectal every 6 hours PRN Temp greater or equal to 38C (100.4F)  ALBUTerol    90 MICROgram(s) HFA Inhaler 2 Puff(s) Inhalation every 4 hours PRN Shortness of Breath and/or Wheezing  benzonatate 100 milliGRAM(s) Oral three times a day PRN Cough  dextrose 40% Gel 15 Gram(s) Oral once PRN Blood Glucose LESS THAN 70 milliGRAM(s)/deciliter  glucagon  Injectable 1 milliGRAM(s) IntraMuscular once PRN Glucose LESS THAN 70 milligrams/deciliter    Vital Signs (24 Hrs):  T(C): 38.6 (04-07-20 @ 08:20), Max: 38.6 (04-07-20 @ 08:20)  HR: 63 (04-07-20 @ 08:20) (59 - 64)  BP: 159/62 (04-07-20 @ 08:20) (116/89 - 169/62)  RR: 22 (04-07-20 @ 08:20) (18 - 22)  SpO2: 99% (04-07-20 @ 08:20) (89% - 100%)  Wt(kg): --  Daily     Daily     I&O's Summary    PHYSICAL EXAM:  GENERAL: NAD, well-developed, comfortable  HEAD:  Atraumatic   EYES: EOMI, conjunctiva and sclera clear  NECK: Supple, No JVD  CHEST/LUNG: bibasilar crackles; no respiratory distress  HEART: Regular rate and rhythm; No murmurs, rubs, or gallops  ABDOMEN: Soft, Nontender, Nondistended; Bowel sounds present  EXTREMITIES:  2+ Peripheral Pulses, No clubbing, cyanosis, or edema  PSYCH: alert, not oriented to place or time, non vocal   NEUROLOGY: non-focal  SKIN: No rashes or lesions    LABS:                        11.8   7.25  )-----------( 153      ( 06 Apr 2020 16:14 )             37.3     06 Apr 2020 16:14    140    |  106    |  42     ----------------------------<  240    4.8     |  28     |  1.58     Ca    9.5        06 Apr 2020 16:14    TPro  8.4    /  Alb  3.5    /  TBili  0.3    /  DBili  x      /  AST  36     /  ALT  29     /  AlkPhos  73     06 Apr 2020 16:14    < from: Xray Chest 1 View-PORTABLE IMMEDIATE (04.06.20 @ 17:08) >  Airspace opacity in the right lower lobe is present. Small left pleural effusion. Cardiomediastinal silhouette is intact.    < end of copied text > History of Present Illness: 	  89 y/o F from [facility]  presenting with decreased po intake and found to have Bilat Pna, with  hypoxia including a drop in O2 sat from 96-98 % on room air to 89-90% on room air.      4/7: Patient evaluated at bedside. Unable to provide history due to underlying dementia, patient is non vocal but she seems to be at baseline per HCP Stacey Garza who I spoke with over the phone. Currently seems comfortable, O2 sat is 99% on RA. In addition, spoke with HCP about code status, she wishes for no heroic measures, patient is now DNR/DNI.     REVIEW OF SYSTEMS: unable to obtain due to advance dementia    MEDICATIONS  (STANDING):  AQUAPHOR (petrolatum Ointment) 1 Application(s) Topical at bedtime  aspirin enteric coated 81 milliGRAM(s) Oral daily  buPROPion XL . 150 milliGRAM(s) Oral daily  dextrose 5%. 1000 milliLiter(s) (50 mL/Hr) IV Continuous <Continuous>  dextrose 50% Injectable 12.5 Gram(s) IV Push once  dextrose 50% Injectable 25 Gram(s) IV Push once  dextrose 50% Injectable 25 Gram(s) IV Push once  enoxaparin Injectable 30 milliGRAM(s) SubCutaneous daily  hydroxychloroquine   Oral   hydroxychloroquine 200 milliGRAM(s) Oral every 12 hours  insulin lispro (HumaLOG) corrective regimen sliding scale   SubCutaneous three times a day before meals  insulin lispro (HumaLOG) corrective regimen sliding scale   SubCutaneous at bedtime  memantine 10 milliGRAM(s) Oral two times a day  QUEtiapine 25 milliGRAM(s) Oral daily  trimethoprim   80 mG/sulfamethoxazole 400 mG 1 Tablet(s) Oral daily    MEDICATIONS  (PRN):  acetaminophen   Tablet .. 650 milliGRAM(s) Oral every 4 hours PRN Temp greater or equal to 38.5C (101.3F)  acetaminophen  Suppository .. 650 milliGRAM(s) Rectal every 6 hours PRN Temp greater or equal to 38C (100.4F)  ALBUTerol    90 MICROgram(s) HFA Inhaler 2 Puff(s) Inhalation every 4 hours PRN Shortness of Breath and/or Wheezing  benzonatate 100 milliGRAM(s) Oral three times a day PRN Cough  dextrose 40% Gel 15 Gram(s) Oral once PRN Blood Glucose LESS THAN 70 milliGRAM(s)/deciliter  glucagon  Injectable 1 milliGRAM(s) IntraMuscular once PRN Glucose LESS THAN 70 milligrams/deciliter    Vital Signs (24 Hrs):  T(C): 38.6 (04-07-20 @ 08:20), Max: 38.6 (04-07-20 @ 08:20)  HR: 63 (04-07-20 @ 08:20) (59 - 64)  BP: 159/62 (04-07-20 @ 08:20) (116/89 - 169/62)  RR: 22 (04-07-20 @ 08:20) (18 - 22)  SpO2: 99% (04-07-20 @ 08:20) (89% - 100%)  Wt(kg): --  Daily     Daily     I&O's Summary    PHYSICAL EXAM:  GENERAL: NAD, well-developed, comfortable  HEAD:  Atraumatic   EYES: EOMI, conjunctiva and sclera clear  NECK: Supple, No JVD  CHEST/LUNG: bibasilar crackles; no respiratory distress  HEART: Regular rate and rhythm; No murmurs, rubs, or gallops  ABDOMEN: Soft, Nontender, Nondistended; Bowel sounds present  EXTREMITIES:  2+ Peripheral Pulses, No clubbing, cyanosis, or edema  PSYCH: alert, not oriented to place or time, non vocal   NEUROLOGY: non-focal  SKIN: No rashes or lesions    LABS:                        11.8   7.25  )-----------( 153      ( 06 Apr 2020 16:14 )             37.3     06 Apr 2020 16:14    140    |  106    |  42     ----------------------------<  240    4.8     |  28     |  1.58     Ca    9.5        06 Apr 2020 16:14    TPro  8.4    /  Alb  3.5    /  TBili  0.3    /  DBili  x      /  AST  36     /  ALT  29     /  AlkPhos  73     06 Apr 2020 16:14    < from: Xray Chest 1 View-PORTABLE IMMEDIATE (04.06.20 @ 17:08) >  Airspace opacity in the right lower lobe is present. Small left pleural effusion. Cardiomediastinal silhouette is intact.    < end of copied text >

## 2020-04-07 NOTE — PROGRESS NOTE ADULT - PROBLEM SELECTOR PLAN 5
- continue bactrim for UTI prophylaxis - YOHANA on CKD4  - baseline creatinine ~1  - likely prerenal in etiology  - PO diet encouraged  - will continue to monitor creatinine - YOHANA on CKD3  - baseline creatinine ~1  - likely prerenal in etiology  - PO diet encouraged  - will continue to monitor creatinine

## 2020-04-07 NOTE — PROGRESS NOTE ADULT - PROBLEM SELECTOR PLAN 1
- 2/2 COVID-19 PNA  - CXR: Airspace opacity in the right lower lobe is present. Small left pleural effusion.  - s/p 1 dose of ceftriaxone in the ED  - continue hydroxychlorquine per COVID 19 protocol - QTc 463   - oxygen supplementation as needed to maintain O2 >90%  - tylenol PRN  - albuterol PRN  - benzonatate - 2/2 COVID-19 PNA  - CXR: Airspace opacity in the right lower lobe is present. Small left pleural effusion.  - s/p 1 dose of ceftriaxone in the ED - will monitor off antibiotics for now  - continue hydroxychlorquine per COVID 19 protocol - QTc 463   - oxygen supplementation as needed to maintain O2 >90%  - tylenol PRN  - albuterol PRN  - benzonatate

## 2020-04-08 DIAGNOSIS — G93.41 METABOLIC ENCEPHALOPATHY: ICD-10-CM

## 2020-04-08 LAB
ALBUMIN SERPL ELPH-MCNC: 3.4 G/DL — SIGNIFICANT CHANGE UP (ref 3.3–5)
ALP SERPL-CCNC: 74 U/L — SIGNIFICANT CHANGE UP (ref 40–120)
ALT FLD-CCNC: 31 U/L — SIGNIFICANT CHANGE UP (ref 12–78)
ANION GAP SERPL CALC-SCNC: 8 MMOL/L — SIGNIFICANT CHANGE UP (ref 5–17)
AST SERPL-CCNC: 46 U/L — HIGH (ref 15–37)
BILIRUB SERPL-MCNC: 0.5 MG/DL — SIGNIFICANT CHANGE UP (ref 0.2–1.2)
BUN SERPL-MCNC: 43 MG/DL — HIGH (ref 7–23)
CALCIUM SERPL-MCNC: 10.1 MG/DL — SIGNIFICANT CHANGE UP (ref 8.5–10.1)
CHLORIDE SERPL-SCNC: 108 MMOL/L — SIGNIFICANT CHANGE UP (ref 96–108)
CO2 SERPL-SCNC: 25 MMOL/L — SIGNIFICANT CHANGE UP (ref 22–31)
CREAT SERPL-MCNC: 1.31 MG/DL — HIGH (ref 0.5–1.3)
GLUCOSE SERPL-MCNC: 216 MG/DL — HIGH (ref 70–99)
HBA1C BLD-MCNC: 9 % — HIGH (ref 4–5.6)
HCT VFR BLD CALC: 38.5 % — SIGNIFICANT CHANGE UP (ref 34.5–45)
HGB BLD-MCNC: 12.1 G/DL — SIGNIFICANT CHANGE UP (ref 11.5–15.5)
MCHC RBC-ENTMCNC: 30.6 PG — SIGNIFICANT CHANGE UP (ref 27–34)
MCHC RBC-ENTMCNC: 31.4 GM/DL — LOW (ref 32–36)
MCV RBC AUTO: 97.2 FL — SIGNIFICANT CHANGE UP (ref 80–100)
PLATELET # BLD AUTO: 170 K/UL — SIGNIFICANT CHANGE UP (ref 150–400)
POTASSIUM SERPL-MCNC: 4.3 MMOL/L — SIGNIFICANT CHANGE UP (ref 3.5–5.3)
POTASSIUM SERPL-SCNC: 4.3 MMOL/L — SIGNIFICANT CHANGE UP (ref 3.5–5.3)
PROT SERPL-MCNC: 8.6 GM/DL — HIGH (ref 6–8.3)
RBC # BLD: 3.96 M/UL — SIGNIFICANT CHANGE UP (ref 3.8–5.2)
RBC # FLD: 11.9 % — SIGNIFICANT CHANGE UP (ref 10.3–14.5)
SODIUM SERPL-SCNC: 141 MMOL/L — SIGNIFICANT CHANGE UP (ref 135–145)
WBC # BLD: 10.02 K/UL — SIGNIFICANT CHANGE UP (ref 3.8–10.5)
WBC # FLD AUTO: 10.02 K/UL — SIGNIFICANT CHANGE UP (ref 3.8–10.5)

## 2020-04-08 PROCEDURE — 99233 SBSQ HOSP IP/OBS HIGH 50: CPT | Mod: CS,GC

## 2020-04-08 RX ORDER — HALOPERIDOL DECANOATE 100 MG/ML
0.5 INJECTION INTRAMUSCULAR EVERY 12 HOURS
Refills: 0 | Status: DISCONTINUED | OUTPATIENT
Start: 2020-04-08 | End: 2020-04-14

## 2020-04-08 RX ADMIN — MEMANTINE HYDROCHLORIDE 10 MILLIGRAM(S): 10 TABLET ORAL at 18:27

## 2020-04-08 RX ADMIN — Medication 2: at 10:03

## 2020-04-08 RX ADMIN — Medication 650 MILLIGRAM(S): at 23:24

## 2020-04-08 RX ADMIN — Medication 200 MILLIGRAM(S): at 16:25

## 2020-04-08 RX ADMIN — Medication 1: at 13:40

## 2020-04-08 RX ADMIN — Medication 1 APPLICATION(S): at 21:02

## 2020-04-08 RX ADMIN — Medication 3: at 18:10

## 2020-04-08 RX ADMIN — HEPARIN SODIUM 5000 UNIT(S): 5000 INJECTION INTRAVENOUS; SUBCUTANEOUS at 10:44

## 2020-04-08 RX ADMIN — HEPARIN SODIUM 5000 UNIT(S): 5000 INJECTION INTRAVENOUS; SUBCUTANEOUS at 21:02

## 2020-04-08 RX ADMIN — Medication 1: at 20:44

## 2020-04-08 NOTE — PROGRESS NOTE ADULT - ASSESSMENT
89 y/o F from [facility]  presenting with decreased po intake and found to have Bilat Pna, with  hypoxia including a drop in O2 sat from 96-98 % on room air to 89-90% on room air. Admitted for:

## 2020-04-08 NOTE — PROVIDER CONTACT NOTE (OTHER) - ACTION/TREATMENT ORDERED:
MD Grigsby made aware, will administer tylenol suppository as per md order, will apply cold compresses, will continue to monitor

## 2020-04-08 NOTE — PROGRESS NOTE ADULT - SUBJECTIVE AND OBJECTIVE BOX
History of Present Illness: 	  89 y/o F from [facility]  presenting with decreased po intake and found to have Bilat Pna, with  hypoxia including a drop in O2 sat from 96-98 % on room air to 89-90% on room air.      4/7: Patient evaluated at bedside. Unable to provide history due to underlying dementia, patient is non vocal but she seems to be at baseline per HCP Stacey Garza who I spoke with over the phone. Currently seems comfortable, O2 sat is 99% on RA. In addition, spoke with HCP about code status, she wishes for no heroic measures, patient is now DNR/DNI.   4/8: Patient evaluated at bedside. No overnight events. Pt desaturated to 88% on RA today and is still febrile. Will continue to monitor.     REVIEW OF SYSTEMS: unable to obtain due to advance dementia    MEDICATIONS  (STANDING):  AQUAPHOR (petrolatum Ointment) 1 Application(s) Topical at bedtime  aspirin enteric coated 81 milliGRAM(s) Oral daily  buPROPion XL . 150 milliGRAM(s) Oral daily  dextrose 5%. 1000 milliLiter(s) (50 mL/Hr) IV Continuous <Continuous>  dextrose 50% Injectable 12.5 Gram(s) IV Push once  dextrose 50% Injectable 25 Gram(s) IV Push once  dextrose 50% Injectable 25 Gram(s) IV Push once  heparin  Injectable 5000 Unit(s) SubCutaneous every 12 hours  hydroxychloroquine   Oral   hydroxychloroquine 200 milliGRAM(s) Oral every 12 hours  insulin lispro (HumaLOG) corrective regimen sliding scale   SubCutaneous three times a day before meals  insulin lispro (HumaLOG) corrective regimen sliding scale   SubCutaneous at bedtime  memantine 10 milliGRAM(s) Oral two times a day  QUEtiapine 25 milliGRAM(s) Oral daily  trimethoprim   80 mG/sulfamethoxazole 400 mG 1 Tablet(s) Oral daily    MEDICATIONS  (PRN):  acetaminophen   Tablet .. 650 milliGRAM(s) Oral every 4 hours PRN Temp greater or equal to 38.5C (101.3F)  acetaminophen  Suppository .. 650 milliGRAM(s) Rectal every 6 hours PRN Temp greater or equal to 38C (100.4F)  ALBUTerol    90 MICROgram(s) HFA Inhaler 2 Puff(s) Inhalation every 4 hours PRN Shortness of Breath and/or Wheezing  benzonatate 100 milliGRAM(s) Oral three times a day PRN Cough  dextrose 40% Gel 15 Gram(s) Oral once PRN Blood Glucose LESS THAN 70 milliGRAM(s)/deciliter  glucagon  Injectable 1 milliGRAM(s) IntraMuscular once PRN Glucose LESS THAN 70 milligrams/deciliter      Vital Signs (24 Hrs):  T(C): 37.9 (04-08-20 @ 10:15), Max: 38.6 (04-07-20 @ 22:28)  HR: 65 (04-08-20 @ 12:00) (62 - 80)  BP: 156/58 (04-08-20 @ 10:15) (124/82 - 156/58)  RR: 22 (04-08-20 @ 12:00) (20 - 24)  SpO2: 95% (04-08-20 @ 12:00) (88% - 96%)  Wt(kg): --  Daily     Daily     I&O's Summary    07 Apr 2020 07:01  -  08 Apr 2020 07:00  --------------------------------------------------------  IN: 0 mL / OUT: 0 mL / NET: 0 mL      PHYSICAL EXAM:  GENERAL: NAD, well-developed, comfortable  HEAD:  Atraumatic   EYES: EOMI, conjunctiva and sclera clear  NECK: Supple, No JVD  CHEST/LUNG: bibasilar crackles; no respiratory distress  HEART: Regular rate and rhythm; No murmurs, rubs, or gallops  ABDOMEN: Soft, Nontender, Nondistended; Bowel sounds present  EXTREMITIES:  2+ Peripheral Pulses, No clubbing, cyanosis, or edema  PSYCH: alert, not oriented to place or time, non vocal   NEUROLOGY: non-focal  SKIN: No rashes or lesions    LABS:                        12.1   10.02 )-----------( 170      ( 08 Apr 2020 07:58 )             38.5     08 Apr 2020 07:58    141    |  108    |  43     ----------------------------<  216    4.3     |  25     |  1.31     Ca    10.1       08 Apr 2020 07:58    TPro  8.6    /  Alb  3.4    /  TBili  0.5    /  DBili  x      /  AST  46     /  ALT  31     /  AlkPhos  74     08 Apr 2020 07:58          < from: Xray Chest 1 View-PORTABLE IMMEDIATE (04.06.20 @ 17:08) >  Airspace opacity in the right lower lobe is present. Small left pleural effusion. Cardiomediastinal silhouette is intact.    < end of copied text >

## 2020-04-08 NOTE — PROGRESS NOTE ADULT - PROBLEM SELECTOR PLAN 1
- 2/2 COVID-19 PNA  - CXR: airspace opacity in the right lower lobe is present. Small left pleural effusion.  - s/p 1 dose of ceftriaxone in the ED - will monitor off antibiotics for now  - continue hydroxychlorquine per COVID 19 protocol - QTc 463   - oxygen supplementation as needed to maintain O2 >90%  - tylenol PRN  - albuterol PRN  - benzonatate

## 2020-04-08 NOTE — PROGRESS NOTE ADULT - PROBLEM SELECTOR PLAN 4
- continue memantine  - continue seroquel - likely 2/2 COVID-19  - refusing PO seroquel - will start Haldol .5mg IV push q12 PRN  - patient is removing nasal cannula from face - will start soft mittens to avoid desaturation   - will continue to monitor - likely 2/2 COVID-19  - refusing PO seroquel - will start Haldol .5mg IV push q12 PRN  - patient is removing nasal cannula from face - will start soft mittens to avoid desaturation   - f/u B12, folate, TSH  - not likely UTI as on bactrim for UTI prophylaxis  - will continue to monitor

## 2020-04-08 NOTE — PROGRESS NOTE ADULT - PROBLEM SELECTOR PLAN 5
- YOHANA on CKD3  - improving   - baseline creatinine ~1  - likely prerenal in etiology  - PO diet encouraged  - will continue to monitor creatinine - continue memantine  - refusing PO medications on PO seroquel at home - will start Haldol .5mg IV push q12 PRN - continue memantine  - refusing PO medications, on PO seroquel at home - will start Haldol .5mg IV push q12 PRN

## 2020-04-08 NOTE — PROGRESS NOTE ADULT - PROBLEM SELECTOR PLAN 6
- continue bactrim for UTI prophylaxis - YOHANA on CKD3  - improving   - baseline creatinine ~1  - likely prerenal in etiology  - PO diet encouraged  - will continue to monitor creatinine

## 2020-04-08 NOTE — PROGRESS NOTE ADULT - SUBJECTIVE AND OBJECTIVE BOX
Phone call to patient's daughter.  Discussed plan to monitor I&O's as well as temp with plan to d/c if stable and afebrile.  Daughter interested in virtual visit.

## 2020-04-09 LAB
ALBUMIN SERPL ELPH-MCNC: 3.2 G/DL — LOW (ref 3.3–5)
ALP SERPL-CCNC: 75 U/L — SIGNIFICANT CHANGE UP (ref 40–120)
ALT FLD-CCNC: 30 U/L — SIGNIFICANT CHANGE UP (ref 12–78)
ANION GAP SERPL CALC-SCNC: 6 MMOL/L — SIGNIFICANT CHANGE UP (ref 5–17)
AST SERPL-CCNC: 42 U/L — HIGH (ref 15–37)
BILIRUB SERPL-MCNC: 0.4 MG/DL — SIGNIFICANT CHANGE UP (ref 0.2–1.2)
BUN SERPL-MCNC: 49 MG/DL — HIGH (ref 7–23)
CALCIUM SERPL-MCNC: 10.2 MG/DL — HIGH (ref 8.5–10.1)
CHLORIDE SERPL-SCNC: 108 MMOL/L — SIGNIFICANT CHANGE UP (ref 96–108)
CK SERPL-CCNC: 326 U/L — HIGH (ref 26–192)
CO2 SERPL-SCNC: 27 MMOL/L — SIGNIFICANT CHANGE UP (ref 22–31)
CREAT SERPL-MCNC: 1.59 MG/DL — HIGH (ref 0.5–1.3)
CRP SERPL-MCNC: 16.51 MG/DL — HIGH (ref 0–0.4)
FERRITIN SERPL-MCNC: 542 NG/ML — HIGH (ref 15–150)
FOLATE SERPL-MCNC: 19.9 NG/ML — SIGNIFICANT CHANGE UP
GLUCOSE SERPL-MCNC: 239 MG/DL — HIGH (ref 70–99)
HCT VFR BLD CALC: 38.4 % — SIGNIFICANT CHANGE UP (ref 34.5–45)
HGB BLD-MCNC: 12 G/DL — SIGNIFICANT CHANGE UP (ref 11.5–15.5)
LDH SERPL L TO P-CCNC: 395 U/L — HIGH (ref 84–241)
MCHC RBC-ENTMCNC: 30.5 PG — SIGNIFICANT CHANGE UP (ref 27–34)
MCHC RBC-ENTMCNC: 31.3 GM/DL — LOW (ref 32–36)
MCV RBC AUTO: 97.5 FL — SIGNIFICANT CHANGE UP (ref 80–100)
PLATELET # BLD AUTO: 191 K/UL — SIGNIFICANT CHANGE UP (ref 150–400)
POTASSIUM SERPL-MCNC: 4.3 MMOL/L — SIGNIFICANT CHANGE UP (ref 3.5–5.3)
POTASSIUM SERPL-SCNC: 4.3 MMOL/L — SIGNIFICANT CHANGE UP (ref 3.5–5.3)
PROT SERPL-MCNC: 8.9 GM/DL — HIGH (ref 6–8.3)
RBC # BLD: 3.94 M/UL — SIGNIFICANT CHANGE UP (ref 3.8–5.2)
RBC # FLD: 11.9 % — SIGNIFICANT CHANGE UP (ref 10.3–14.5)
SODIUM SERPL-SCNC: 141 MMOL/L — SIGNIFICANT CHANGE UP (ref 135–145)
TSH SERPL-MCNC: 0.99 UU/ML — SIGNIFICANT CHANGE UP (ref 0.34–4.82)
VIT B12 SERPL-MCNC: 770 PG/ML — SIGNIFICANT CHANGE UP (ref 232–1245)
WBC # BLD: 10.13 K/UL — SIGNIFICANT CHANGE UP (ref 3.8–10.5)
WBC # FLD AUTO: 10.13 K/UL — SIGNIFICANT CHANGE UP (ref 3.8–10.5)

## 2020-04-09 PROCEDURE — 99233 SBSQ HOSP IP/OBS HIGH 50: CPT | Mod: CS,GC

## 2020-04-09 RX ORDER — INSULIN GLARGINE 100 [IU]/ML
10 INJECTION, SOLUTION SUBCUTANEOUS AT BEDTIME
Refills: 0 | Status: DISCONTINUED | OUTPATIENT
Start: 2020-04-09 | End: 2020-04-14

## 2020-04-09 RX ORDER — INSULIN LISPRO 100/ML
VIAL (ML) SUBCUTANEOUS
Refills: 0 | Status: DISCONTINUED | OUTPATIENT
Start: 2020-04-09 | End: 2020-04-14

## 2020-04-09 RX ADMIN — Medication 6: at 13:02

## 2020-04-09 RX ADMIN — INSULIN GLARGINE 10 UNIT(S): 100 INJECTION, SOLUTION SUBCUTANEOUS at 23:43

## 2020-04-09 RX ADMIN — Medication 650 MILLIGRAM(S): at 06:09

## 2020-04-09 RX ADMIN — HEPARIN SODIUM 5000 UNIT(S): 5000 INJECTION INTRAVENOUS; SUBCUTANEOUS at 09:19

## 2020-04-09 RX ADMIN — Medication 1 APPLICATION(S): at 23:45

## 2020-04-09 RX ADMIN — HEPARIN SODIUM 5000 UNIT(S): 5000 INJECTION INTRAVENOUS; SUBCUTANEOUS at 23:45

## 2020-04-09 NOTE — PROGRESS NOTE ADULT - ASSESSMENT
89 y/o F from [facility]  presenting with decreased po intake and found to have Bilat Pna, with  hypoxia including a drop in O2 sat from 96-98 % on room air to 89-90% on room air. Admitted for:

## 2020-04-09 NOTE — PROGRESS NOTE ADULT - PROBLEM SELECTOR PLAN 5
- continue memantine  - refusing PO medications, on PO seroquel at home - for now continue Haldol .5mg IV push q12 PRN

## 2020-04-09 NOTE — PROGRESS NOTE ADULT - PROBLEM SELECTOR PLAN 4
- likely 2/2 COVID-19  - refusing PO seroquel - for now continue Haldol .5mg IV push q12 PRN  - patient is removing nasal cannula from face - will start soft mittens to avoid desaturation   - B12, folate, TSH WNL  - not likely UTI as on bactrim for UTI prophylaxis  - will continue to monitor

## 2020-04-09 NOTE — PROGRESS NOTE ADULT - PROBLEM SELECTOR PLAN 6
- YOHANA on CKD3  - baseline creatinine ~1  - likely prerenal in etiology - patient with decreased PO intake. expect improved creatinine when PO intake increases. Consider small fluid bolus if creatinine continues to rise.   - monitor I/Os  - bladder scan to r/o retention   - will continue to monitor creatinine

## 2020-04-09 NOTE — PROGRESS NOTE ADULT - SUBJECTIVE AND OBJECTIVE BOX
History of Present Illness: 	  89 y/o F from [facility]  presenting with decreased po intake and found to have Bilat Pna, with  hypoxia including a drop in O2 sat from 96-98 % on room air to 89-90% on room air.      4/7: Patient evaluated at bedside. Unable to provide history due to underlying dementia, patient is non vocal but she seems to be at baseline per HCP Stacey Garza who I spoke with over the phone. Currently seems comfortable, O2 sat is 99% on RA. In addition, spoke with HCP about code status, she wishes for no heroic measures, patient is now DNR/DNI.   4/8: Patient evaluated at bedside. No overnight events. Pt desaturated to 88% on RA today and is still febrile. Will continue to monitor.   4/9:  Patient evaluated at bedside. Overnight refusing medications. This am removing nasal cannula from face and desaturating. Still with intermittent fevers.     REVIEW OF SYSTEMS: unable to obtain due to advance dementia    MEDICATIONS  (STANDING):  AQUAPHOR (petrolatum Ointment) 1 Application(s) Topical at bedtime  aspirin enteric coated 81 milliGRAM(s) Oral daily  buPROPion XL . 150 milliGRAM(s) Oral daily  dextrose 5%. 1000 milliLiter(s) (50 mL/Hr) IV Continuous <Continuous>  dextrose 50% Injectable 12.5 Gram(s) IV Push once  dextrose 50% Injectable 25 Gram(s) IV Push once  dextrose 50% Injectable 25 Gram(s) IV Push once  heparin  Injectable 5000 Unit(s) SubCutaneous every 12 hours  hydroxychloroquine   Oral   hydroxychloroquine 200 milliGRAM(s) Oral every 12 hours  insulin lispro (HumaLOG) corrective regimen sliding scale   SubCutaneous three times a day before meals  insulin lispro (HumaLOG) corrective regimen sliding scale   SubCutaneous at bedtime  memantine 10 milliGRAM(s) Oral two times a day  QUEtiapine 25 milliGRAM(s) Oral daily  trimethoprim   80 mG/sulfamethoxazole 400 mG 1 Tablet(s) Oral daily    MEDICATIONS  (PRN):  acetaminophen   Tablet .. 650 milliGRAM(s) Oral every 4 hours PRN Temp greater or equal to 38.5C (101.3F)  acetaminophen  Suppository .. 650 milliGRAM(s) Rectal every 6 hours PRN Temp greater or equal to 38C (100.4F)  ALBUTerol    90 MICROgram(s) HFA Inhaler 2 Puff(s) Inhalation every 4 hours PRN Shortness of Breath and/or Wheezing  benzonatate 100 milliGRAM(s) Oral three times a day PRN Cough  dextrose 40% Gel 15 Gram(s) Oral once PRN Blood Glucose LESS THAN 70 milliGRAM(s)/deciliter  glucagon  Injectable 1 milliGRAM(s) IntraMuscular once PRN Glucose LESS THAN 70 milligrams/deciliter      Vital Signs (24 Hrs):  T(C): 37.3 (04-09-20 @ 13:00), Max: 38.9 (04-08-20 @ 23:20)  HR: 65 (04-09-20 @ 13:00) (63 - 75)  BP: 158/68 (04-09-20 @ 08:41) (131/55 - 158/68)  RR: 22 (04-09-20 @ 13:00) (22 - 24)  SpO2: 94% (04-09-20 @ 13:00) (92% - 96%)  Wt(kg): --  Daily     Daily     I&O's Summary    08 Apr 2020 07:01  -  09 Apr 2020 07:00  --------------------------------------------------------  IN: 120 mL / OUT: 0 mL / NET: 120 mL      PHYSICAL EXAM:  GENERAL: NAD, well-developed, comfortable  HEAD:  Atraumatic   EYES: EOMI, conjunctiva and sclera clear  NECK: Supple, No JVD  CHEST/LUNG: bibasilar crackles; no respiratory distress  HEART: Regular rate and rhythm; No murmurs, rubs, or gallops  ABDOMEN: Soft, Nontender, Nondistended; Bowel sounds present  EXTREMITIES:  2+ Peripheral Pulses, No clubbing, cyanosis, or edema  PSYCH: alert, not oriented to place or time, non vocal   NEUROLOGY: non-focal  SKIN: No rashes or lesions    LABS:                        12.0   10.13 )-----------( 191      ( 09 Apr 2020 09:21 )             38.4     09 Apr 2020 09:21    141    |  108    |  49     ----------------------------<  239    4.3     |  27     |  1.59     Ca    10.2       09 Apr 2020 09:21    TPro  8.9    /  Alb  3.2    /  TBili  0.4    /  DBili  x      /  AST  42     /  ALT  30     /  AlkPhos  75     09 Apr 2020 09:21            < from: Xray Chest 1 View-PORTABLE IMMEDIATE (04.06.20 @ 17:08) >  Airspace opacity in the right lower lobe is present. Small left pleural effusion. Cardiomediastinal silhouette is intact.    < end of copied text > History of Present Illness: 	  89 y/o F from [facility]  presenting with decreased po intake and found to have Bilat Pna, with  hypoxia including a drop in O2 sat from 96-98 % on room air to 89-90% on room air.      4/7: Patient evaluated at bedside. Unable to provide history due to underlying dementia, patient is non vocal but she seems to be at baseline per HCP Stacey Garza who I spoke with over the phone. Currently seems comfortable, O2 sat is 99% on RA. In addition, spoke with HCP about code status, she wishes for no heroic measures, patient is now DNR/DNI.   4/8: Patient evaluated at bedside. No overnight events. Pt desaturated to 88% on RA today and is still febrile. Will continue to monitor.   4/9:  Patient evaluated at bedside. Overnight refusing medications. This am removing nasal cannula from face and desaturating. Still with intermittent fevers.     REVIEW OF SYSTEMS: unable to obtain due to advance dementia    MEDICATIONS  (STANDING):  AQUAPHOR (petrolatum Ointment) 1 Application(s) Topical at bedtime  aspirin enteric coated 81 milliGRAM(s) Oral daily  buPROPion XL . 150 milliGRAM(s) Oral daily  dextrose 5%. 1000 milliLiter(s) (50 mL/Hr) IV Continuous <Continuous>  dextrose 50% Injectable 12.5 Gram(s) IV Push once  dextrose 50% Injectable 25 Gram(s) IV Push once  dextrose 50% Injectable 25 Gram(s) IV Push once  heparin  Injectable 5000 Unit(s) SubCutaneous every 12 hours  hydroxychloroquine   Oral   hydroxychloroquine 200 milliGRAM(s) Oral every 12 hours  insulin glargine Injectable (LANTUS) 10 Unit(s) SubCutaneous at bedtime  insulin lispro (HumaLOG) corrective regimen sliding scale   SubCutaneous three times a day before meals  insulin lispro (HumaLOG) corrective regimen sliding scale   SubCutaneous at bedtime  memantine 10 milliGRAM(s) Oral two times a day  QUEtiapine 25 milliGRAM(s) Oral daily  trimethoprim   80 mG/sulfamethoxazole 400 mG 1 Tablet(s) Oral daily    MEDICATIONS  (PRN):  acetaminophen   Tablet .. 650 milliGRAM(s) Oral every 4 hours PRN Temp greater or equal to 38.5C (101.3F)  acetaminophen  Suppository .. 650 milliGRAM(s) Rectal every 6 hours PRN Temp greater or equal to 38C (100.4F)  ALBUTerol    90 MICROgram(s) HFA Inhaler 2 Puff(s) Inhalation every 4 hours PRN Shortness of Breath and/or Wheezing  benzonatate 100 milliGRAM(s) Oral three times a day PRN Cough  dextrose 40% Gel 15 Gram(s) Oral once PRN Blood Glucose LESS THAN 70 milliGRAM(s)/deciliter  glucagon  Injectable 1 milliGRAM(s) IntraMuscular once PRN Glucose LESS THAN 70 milligrams/deciliter  haloperidol    Injectable 0.5 milliGRAM(s) IV Push every 12 hours PRN agitation      Vital Signs (24 Hrs):  T(C): 37.3 (04-09-20 @ 13:00), Max: 38.9 (04-08-20 @ 23:20)  HR: 65 (04-09-20 @ 13:00) (63 - 75)  BP: 158/68 (04-09-20 @ 08:41) (131/55 - 158/68)  RR: 22 (04-09-20 @ 13:00) (22 - 24)  SpO2: 94% (04-09-20 @ 13:00) (92% - 96%)  Wt(kg): --  Daily     Daily     I&O's Summary    08 Apr 2020 07:01  -  09 Apr 2020 07:00  --------------------------------------------------------  IN: 120 mL / OUT: 0 mL / NET: 120 mL      PHYSICAL EXAM:  GENERAL: NAD, comfortable  HEAD:  Atraumatic   EYES: EOMI, conjunctiva and sclera clear  NECK: Supple, No JVD  CHEST/LUNG: bibasilar crackles; no respiratory distress  HEART: Regular rate and rhythm; No murmurs, rubs, or gallops  ABDOMEN: Soft, Nontender, Nondistended; Bowel sounds present  EXTREMITIES:  2+ Peripheral Pulses, No clubbing, cyanosis, or edema  PSYCH: alert, not oriented to place or time, non vocal   NEUROLOGY: non-focal  SKIN: No rashes or lesions    LABS:                        12.0   10.13 )-----------( 191      ( 09 Apr 2020 09:21 )             38.4     09 Apr 2020 09:21    141    |  108    |  49     ----------------------------<  239    4.3     |  27     |  1.59     Ca    10.2       09 Apr 2020 09:21    TPro  8.9    /  Alb  3.2    /  TBili  0.4    /  DBili  x      /  AST  42     /  ALT  30     /  AlkPhos  75     09 Apr 2020 09:21            < from: Xray Chest 1 View-PORTABLE IMMEDIATE (04.06.20 @ 17:08) >  Airspace opacity in the right lower lobe is present. Small left pleural effusion. Cardiomediastinal silhouette is intact.    < end of copied text >

## 2020-04-09 NOTE — PROGRESS NOTE ADULT - PROBLEM SELECTOR PLAN 2
- COVID-19 PCR positive  - management as above  - blood cultures NGTD  - inflammatory markers elevated - will monitor

## 2020-04-10 LAB
ANION GAP SERPL CALC-SCNC: 10 MMOL/L — SIGNIFICANT CHANGE UP (ref 5–17)
BUN SERPL-MCNC: 60 MG/DL — HIGH (ref 7–23)
CALCIUM SERPL-MCNC: 10.3 MG/DL — HIGH (ref 8.5–10.1)
CHLORIDE SERPL-SCNC: 110 MMOL/L — HIGH (ref 96–108)
CO2 SERPL-SCNC: 26 MMOL/L — SIGNIFICANT CHANGE UP (ref 22–31)
CREAT SERPL-MCNC: 1.74 MG/DL — HIGH (ref 0.5–1.3)
CRP SERPL-MCNC: 17.64 MG/DL — HIGH (ref 0–0.4)
FERRITIN SERPL-MCNC: 597 NG/ML — HIGH (ref 15–150)
GLUCOSE SERPL-MCNC: 223 MG/DL — HIGH (ref 70–99)
HCT VFR BLD CALC: 41.8 % — SIGNIFICANT CHANGE UP (ref 34.5–45)
HGB BLD-MCNC: 12.7 G/DL — SIGNIFICANT CHANGE UP (ref 11.5–15.5)
MCHC RBC-ENTMCNC: 30.4 GM/DL — LOW (ref 32–36)
MCHC RBC-ENTMCNC: 30.4 PG — SIGNIFICANT CHANGE UP (ref 27–34)
MCV RBC AUTO: 100 FL — SIGNIFICANT CHANGE UP (ref 80–100)
PLATELET # BLD AUTO: 204 K/UL — SIGNIFICANT CHANGE UP (ref 150–400)
POTASSIUM SERPL-MCNC: 3.8 MMOL/L — SIGNIFICANT CHANGE UP (ref 3.5–5.3)
POTASSIUM SERPL-SCNC: 3.8 MMOL/L — SIGNIFICANT CHANGE UP (ref 3.5–5.3)
PROCALCITONIN SERPL-MCNC: 0.33 NG/ML — HIGH (ref 0.02–0.1)
RBC # BLD: 4.18 M/UL — SIGNIFICANT CHANGE UP (ref 3.8–5.2)
RBC # FLD: 11.9 % — SIGNIFICANT CHANGE UP (ref 10.3–14.5)
SODIUM SERPL-SCNC: 146 MMOL/L — HIGH (ref 135–145)
WBC # BLD: 10.91 K/UL — HIGH (ref 3.8–10.5)
WBC # FLD AUTO: 10.91 K/UL — HIGH (ref 3.8–10.5)

## 2020-04-10 PROCEDURE — 99233 SBSQ HOSP IP/OBS HIGH 50: CPT | Mod: CS,GC

## 2020-04-10 RX ORDER — CEFTRIAXONE 500 MG/1
1000 INJECTION, POWDER, FOR SOLUTION INTRAMUSCULAR; INTRAVENOUS EVERY 24 HOURS
Refills: 0 | Status: DISCONTINUED | OUTPATIENT
Start: 2020-04-10 | End: 2020-04-11

## 2020-04-10 RX ORDER — SODIUM CHLORIDE 9 MG/ML
1000 INJECTION INTRAMUSCULAR; INTRAVENOUS; SUBCUTANEOUS
Refills: 0 | Status: DISCONTINUED | OUTPATIENT
Start: 2020-04-10 | End: 2020-04-11

## 2020-04-10 RX ORDER — CEFTRIAXONE 500 MG/1
1000 INJECTION, POWDER, FOR SOLUTION INTRAMUSCULAR; INTRAVENOUS EVERY 24 HOURS
Refills: 0 | Status: DISCONTINUED | OUTPATIENT
Start: 2020-04-10 | End: 2020-04-10

## 2020-04-10 RX ADMIN — HEPARIN SODIUM 5000 UNIT(S): 5000 INJECTION INTRAVENOUS; SUBCUTANEOUS at 22:54

## 2020-04-10 RX ADMIN — Medication 650 MILLIGRAM(S): at 01:31

## 2020-04-10 RX ADMIN — HEPARIN SODIUM 5000 UNIT(S): 5000 INJECTION INTRAVENOUS; SUBCUTANEOUS at 09:19

## 2020-04-10 RX ADMIN — Medication 1 APPLICATION(S): at 22:53

## 2020-04-10 RX ADMIN — Medication 4: at 08:53

## 2020-04-10 RX ADMIN — INSULIN GLARGINE 10 UNIT(S): 100 INJECTION, SOLUTION SUBCUTANEOUS at 22:54

## 2020-04-10 RX ADMIN — CEFTRIAXONE 1000 MILLIGRAM(S): 500 INJECTION, POWDER, FOR SOLUTION INTRAMUSCULAR; INTRAVENOUS at 17:03

## 2020-04-10 RX ADMIN — SODIUM CHLORIDE 50 MILLILITER(S): 9 INJECTION INTRAMUSCULAR; INTRAVENOUS; SUBCUTANEOUS at 14:23

## 2020-04-10 RX ADMIN — Medication 2: at 12:39

## 2020-04-10 NOTE — PROGRESS NOTE ADULT - SUBJECTIVE AND OBJECTIVE BOX
HPI:	  89 y/o F from [facility]  presenting with decreased po intake and found to have Bilat Pna, with  hypoxia including a drop in O2 sat from 96-98 % on room air to 89-90% on room air.      SUBJECTIVE    4/7: Patient evaluated at bedside. Unable to provide history due to underlying dementia, patient is non vocal but she seems to be at baseline per HCP Stacey Garza who I spoke with over the phone. Currently seems comfortable, O2 sat is 99% on RA. In addition, spoke with HCP about code status, she wishes for no heroic measures, patient is now DNR/DNI.   4/8: Patient evaluated at bedside. No overnight events. Pt desaturated to 88% on RA today and is still febrile. Will continue to monitor.   4/9:  Patient evaluated at bedside. Overnight refusing medications. This am removing nasal cannula from face and desaturating. Still with intermittent fevers.     4/10: Pt seen and examined at bedside. Fatigue appearing and noncommunicative with morning with mittens on.    REVIEW OF SYSTEMS: unable to obtain due to advance dementia    MEDICATIONS  (STANDING):  AQUAPHOR (petrolatum Ointment) 1 Application(s) Topical at bedtime  aspirin enteric coated 81 milliGRAM(s) Oral daily  buPROPion XL . 150 milliGRAM(s) Oral daily  dextrose 5%. 1000 milliLiter(s) (50 mL/Hr) IV Continuous <Continuous>  dextrose 50% Injectable 12.5 Gram(s) IV Push once  dextrose 50% Injectable 25 Gram(s) IV Push once  dextrose 50% Injectable 25 Gram(s) IV Push once  heparin  Injectable 5000 Unit(s) SubCutaneous every 12 hours  hydroxychloroquine   Oral   hydroxychloroquine 200 milliGRAM(s) Oral every 12 hours  insulin glargine Injectable (LANTUS) 10 Unit(s) SubCutaneous at bedtime  insulin lispro (HumaLOG) corrective regimen sliding scale   SubCutaneous three times a day before meals  insulin lispro (HumaLOG) corrective regimen sliding scale   SubCutaneous at bedtime  memantine 10 milliGRAM(s) Oral two times a day  QUEtiapine 25 milliGRAM(s) Oral daily  sodium chloride 0.9%. 1000 milliLiter(s) (50 mL/Hr) IV Continuous <Continuous>  trimethoprim   80 mG/sulfamethoxazole 400 mG 1 Tablet(s) Oral daily    MEDICATIONS  (PRN):  acetaminophen   Tablet .. 650 milliGRAM(s) Oral every 4 hours PRN Temp greater or equal to 38.5C (101.3F)  acetaminophen  Suppository .. 650 milliGRAM(s) Rectal every 6 hours PRN Temp greater or equal to 38C (100.4F)  ALBUTerol    90 MICROgram(s) HFA Inhaler 2 Puff(s) Inhalation every 4 hours PRN Shortness of Breath and/or Wheezing  benzonatate 100 milliGRAM(s) Oral three times a day PRN Cough  dextrose 40% Gel 15 Gram(s) Oral once PRN Blood Glucose LESS THAN 70 milliGRAM(s)/deciliter  glucagon  Injectable 1 milliGRAM(s) IntraMuscular once PRN Glucose LESS THAN 70 milligrams/deciliter  haloperidol    Injectable 0.5 milliGRAM(s) IV Push every 12 hours PRN agitation   Injectable 0.5 milliGRAM(s) IV Push every 12 hours PRN agitation      Vital Signs Last 24 Hrs  T(C): 36.6 (10 Apr 2020 09:01), Max: 38.6 (10 Apr 2020 00:54)  T(F): 97.9 (10 Apr 2020 09:01), Max: 101.4 (10 Apr 2020 00:54)  HR: 63 (10 Apr 2020 09:01) (63 - 79)  BP: 156/65 (10 Apr 2020 09:01) (134/37 - 156/65)  BP(mean): --  RR: 24 (10 Apr 2020 09:01) (22 - 25)  SpO2: 92% (10 Apr 2020 09:01) (92% - 93%)      I&O's Summary      PHYSICAL EXAM:  GENERAL: NAD  HEAD:  Atraumatic   EYES: conjunctiva and sclera clear  CHEST/LUNG: bibasilar crackles; no respiratory distress  HEART: Regular rate and rhythm  ABDOMEN:  Nondistended  EXTREMITIES:  2+ Peripheral Pulses  PSYCH: alert, not oriented to place or time, non vocal   SKIN: No rashes or lesions    LABS:             04-10    146<H>  |  110<H>  |  60<H>  ----------------------------<  223<H>  3.8   |  26  |  1.74<H>    Ca    10.3<H>      10 Apr 2020 09:34    TPro  8.9<H>  /  Alb  3.2<L>  /  TBili  0.4  /  DBili  x   /  AST  42<H>  /  ALT  30  /  AlkPhos  75  04-09                          12.7   10.91 )-----------( 204      ( 10 Apr 2020 09:34 )             41.8           < from: Xray Chest 1 View-PORTABLE IMMEDIATE (04.06.20 @ 17:08) >  Airspace opacity in the right lower lobe is present. Small left pleural effusion. Cardiomediastinal silhouette is intact.    < end of copied text >

## 2020-04-10 NOTE — PROGRESS NOTE ADULT - ASSESSMENT
89 y/o F from [facility]  presenting with decreased po intake and found to have Bilat Pna, with  hypoxia including a drop in O2 sat from 96-98 % on room air to 89-90% on room air. Admitted for:

## 2020-04-10 NOTE — PROGRESS NOTE ADULT - SUBJECTIVE AND OBJECTIVE BOX
Spoke with patient's daughter, Stacey, regarding her current status. We discussed palliative care and the daughter would like to think about it. All questions from bother her and her  were answered.

## 2020-04-10 NOTE — PROGRESS NOTE ADULT - PROBLEM SELECTOR PLAN 5
- continue memantine  - refusing PO medications, on PO seroquel at home - for now continue Haldol .5mg IV push q12 PRN  -Consider Palliative consult- discussed with family, given patient resisting meds, and  hx of dementia may need to consider deesc

## 2020-04-10 NOTE — PROGRESS NOTE ADULT - PROBLEM SELECTOR PLAN 4
- likely 2/2 COVID-19  - refusing PO seroquel - for now continue Haldol .5mg IV push q12 PRN  - patient is removing nasal cannula from face - continue soft mittens to avoid desaturation   - B12, folate, TSH WNL  - not likely UTI as on bactrim for UTI prophylaxis-- pt refusing PO tablets   - will continue to monitor

## 2020-04-10 NOTE — CHART NOTE - NSCHARTNOTEFT_GEN_A_CORE
Spoke with patient's son Maggie regarding considering Palliative care consultation.   Discussed patient's hospital course thus far with her son, and possible plan to d/c to assisted living if patient remains stable this weekend.     Maggie [phone]

## 2020-04-11 LAB
ANION GAP SERPL CALC-SCNC: 9 MMOL/L — SIGNIFICANT CHANGE UP (ref 5–17)
BUN SERPL-MCNC: 59 MG/DL — HIGH (ref 7–23)
CALCIUM SERPL-MCNC: 10.1 MG/DL — SIGNIFICANT CHANGE UP (ref 8.5–10.1)
CHLORIDE SERPL-SCNC: 117 MMOL/L — HIGH (ref 96–108)
CO2 SERPL-SCNC: 26 MMOL/L — SIGNIFICANT CHANGE UP (ref 22–31)
CREAT SERPL-MCNC: 1.38 MG/DL — HIGH (ref 0.5–1.3)
CULTURE RESULTS: SIGNIFICANT CHANGE UP
GLUCOSE SERPL-MCNC: 147 MG/DL — HIGH (ref 70–99)
HCT VFR BLD CALC: 42.1 % — SIGNIFICANT CHANGE UP (ref 34.5–45)
HGB BLD-MCNC: 13 G/DL — SIGNIFICANT CHANGE UP (ref 11.5–15.5)
MCHC RBC-ENTMCNC: 30.9 GM/DL — LOW (ref 32–36)
MCHC RBC-ENTMCNC: 30.9 PG — SIGNIFICANT CHANGE UP (ref 27–34)
MCV RBC AUTO: 100 FL — SIGNIFICANT CHANGE UP (ref 80–100)
PLATELET # BLD AUTO: 227 K/UL — SIGNIFICANT CHANGE UP (ref 150–400)
POTASSIUM SERPL-MCNC: 4 MMOL/L — SIGNIFICANT CHANGE UP (ref 3.5–5.3)
POTASSIUM SERPL-SCNC: 4 MMOL/L — SIGNIFICANT CHANGE UP (ref 3.5–5.3)
RBC # BLD: 4.21 M/UL — SIGNIFICANT CHANGE UP (ref 3.8–5.2)
RBC # FLD: 11.9 % — SIGNIFICANT CHANGE UP (ref 10.3–14.5)
SODIUM SERPL-SCNC: 152 MMOL/L — HIGH (ref 135–145)
SPECIMEN SOURCE: SIGNIFICANT CHANGE UP
WBC # BLD: 10.16 K/UL — SIGNIFICANT CHANGE UP (ref 3.8–10.5)
WBC # FLD AUTO: 10.16 K/UL — SIGNIFICANT CHANGE UP (ref 3.8–10.5)

## 2020-04-11 PROCEDURE — 99233 SBSQ HOSP IP/OBS HIGH 50: CPT | Mod: CS,GC

## 2020-04-11 RX ORDER — SODIUM CHLORIDE 9 MG/ML
1000 INJECTION INTRAMUSCULAR; INTRAVENOUS; SUBCUTANEOUS
Refills: 0 | Status: DISCONTINUED | OUTPATIENT
Start: 2020-04-11 | End: 2020-04-12

## 2020-04-11 RX ADMIN — Medication 1 APPLICATION(S): at 23:00

## 2020-04-11 RX ADMIN — HEPARIN SODIUM 5000 UNIT(S): 5000 INJECTION INTRAVENOUS; SUBCUTANEOUS at 23:01

## 2020-04-11 RX ADMIN — INSULIN GLARGINE 10 UNIT(S): 100 INJECTION, SOLUTION SUBCUTANEOUS at 23:02

## 2020-04-11 RX ADMIN — SODIUM CHLORIDE 100 MILLILITER(S): 9 INJECTION INTRAMUSCULAR; INTRAVENOUS; SUBCUTANEOUS at 16:51

## 2020-04-11 RX ADMIN — HEPARIN SODIUM 5000 UNIT(S): 5000 INJECTION INTRAVENOUS; SUBCUTANEOUS at 10:11

## 2020-04-11 NOTE — PROGRESS NOTE ADULT - PROBLEM SELECTOR PLAN 6
-YOHANA on CKD3 (b/l Cr 1?)  -Likely due to decreased oral intake  -Cr improving  -IV NS increased to 100cc/hr  -Trend Cr

## 2020-04-11 NOTE — PROGRESS NOTE ADULT - PROBLEM SELECTOR PLAN 1
-Secondary to COVID-19 PNA  -Supportive care with oxygen PRN. Currently requiring nasal cannula 2L  -Continue tylenol PRN fever, albuterol PRN dyspnea, and benzonatate PRN cough  -Manage COVID-19 as below

## 2020-04-11 NOTE — PROGRESS NOTE ADULT - PROBLEM SELECTOR PLAN 5
-Continue to attempt administration of memantine and seroquel  -Appreciate physician liaison; reached out to family concerning palliative care consult. They are not ready to pursue palliative consult at this time

## 2020-04-11 NOTE — PROGRESS NOTE ADULT - PROBLEM SELECTOR PLAN 4
-Likely secondary to COVID-19 PNA in the setting of poor reserve due to baseline dementia  -Refusing seroquel. Has not required PRN haldol in the past 24 hours  -Continue soft mittens to avoid patient removing nasal cannula  -Monitor mental status

## 2020-04-11 NOTE — PROGRESS NOTE ADULT - PROBLEM SELECTOR PLAN 2
-COVID-19 PNA causing acute hypoxemic respiratory failure  -Manage respiratory failure as above  -S/p plaquenil 4/6-4/10  -Discontinuing ceftriaxone as superimposed bacterial PNA unlikely

## 2020-04-11 NOTE — PROGRESS NOTE ADULT - ASSESSMENT
89F from [employer] [facility] presented for anorexia and was found to have acute hypoxic respiratory failure secondary to COVID-19 PNA.

## 2020-04-11 NOTE — PROGRESS NOTE ADULT - SUBJECTIVE AND OBJECTIVE BOX
89F from [facility] presented for anorexia and was found to have acute hypoxic respiratory failure secondary to COVID-19 PNA.    4/11/20: Patient evaluated at bedside. Non-verbal. Localizes to light touch. Attempts to pull off nasal cannula when soft mittens are removed. Patient noted to have SpO2 79% in 2L/min. Nasal cannula increased to 6L/min with improvement to 92%.    Vital Signs Last 24 Hrs  T(C): 37 (11 Apr 2020 09:05), Max: 37 (11 Apr 2020 01:09)  T(F): 98.6 (11 Apr 2020 09:05), Max: 98.6 (11 Apr 2020 01:09)  HR: 71 (11 Apr 2020 09:05) (66 - 71)  BP: 92/54 (11 Apr 2020 09:05) (92/54 - 161/62)  BP(mean): --  RR: 22 (11 Apr 2020 09:05) (22 - 22)  SpO2: 94% (11 Apr 2020 09:05) (93% - 94%)    Physical Exam  Gen: WD, cachectic, NAD  HENT: dry mucous membranes  Neck: Trachea midline  CV: RRR, no r/g/m  Pulm: Anterior and lateral lung fields clear to auscultation  Abd: Soft, NT, ND  Ext: No LE edema  Neuro: Nonsensical verbalizations, localizes to light touch, moves all four limbs spontaneously      LABS:  cret                        13.0   10.16 )-----------( 227      ( 11 Apr 2020 08:44 )             42.1     04-11    152<H>  |  117<H>  |  59<H>  ----------------------------<  147<H>  4.0   |  26  |  1.38<H>    Ca    10.1      11 Apr 2020 08:44        < from: Xray Chest 1 View-PORTABLE IMMEDIATE (04.06.20 @ 17:08) >  Airspace opacity in the right lower lobe is present. Small left pleural effusion. Cardiomediastinal silhouette is intact.    < end of copied text >

## 2020-04-12 PROCEDURE — 99232 SBSQ HOSP IP/OBS MODERATE 35: CPT | Mod: CS,GC

## 2020-04-12 RX ORDER — HYDRALAZINE HCL 50 MG
5 TABLET ORAL ONCE
Refills: 0 | Status: COMPLETED | OUTPATIENT
Start: 2020-04-12 | End: 2020-04-12

## 2020-04-12 RX ADMIN — HEPARIN SODIUM 5000 UNIT(S): 5000 INJECTION INTRAVENOUS; SUBCUTANEOUS at 21:41

## 2020-04-12 RX ADMIN — Medication 1 APPLICATION(S): at 21:40

## 2020-04-12 RX ADMIN — INSULIN GLARGINE 10 UNIT(S): 100 INJECTION, SOLUTION SUBCUTANEOUS at 21:41

## 2020-04-12 RX ADMIN — Medication 5 MILLIGRAM(S): at 18:55

## 2020-04-12 RX ADMIN — HEPARIN SODIUM 5000 UNIT(S): 5000 INJECTION INTRAVENOUS; SUBCUTANEOUS at 09:08

## 2020-04-12 RX ADMIN — HALOPERIDOL DECANOATE 0.5 MILLIGRAM(S): 100 INJECTION INTRAMUSCULAR at 21:42

## 2020-04-12 NOTE — PROGRESS NOTE ADULT - PROBLEM SELECTOR PLAN 6
Contacted patient to convey results/recommendations.  Patient verbalized understanding and all questions/concerns were addressed.  Encouraged patient to call with any other questions/concerns.     Patient also inquiring about increasing his Lipitor from 10mg to 40mg.  Per telephone encounter on 10/16/18 Increase Lipitor 40 mg daily to help reduce progression of coronary artery disease.  Echo stress test should have been ordered.       -YOHANA on CKD3 (b/l Cr 1?)  -Likely due to decreased oral intake  -Cr improving  -IV NS increased to 100cc/hr  -Trend Cr

## 2020-04-12 NOTE — PROGRESS NOTE ADULT - ASSESSMENT
89F from [place] [facility] presented for anorexia and was found to have acute hypoxic respiratory failure secondary to COVID-19 PNA.

## 2020-04-12 NOTE — PROGRESS NOTE ADULT - SUBJECTIVE AND OBJECTIVE BOX
89F from [facility] presented for anorexia and was found to have acute hypoxic respiratory failure secondary to COVID-19 PNA.    4/12/20: Patient evaluated at bedside. Non-verbal. Localizes to light touch. Patient currently requiring 6L nasal cannula    Vital Signs Last 24 Hrs  T(C): 36.5 (12 Apr 2020 07:33), Max: 37.1 (11 Apr 2020 23:30)  T(F): 97.7 (12 Apr 2020 07:33), Max: 98.7 (11 Apr 2020 23:30)  HR: 70 (12 Apr 2020 07:33) (63 - 70)  BP: 146/68 (12 Apr 2020 07:33) (146/68 - 169/83)  BP(mean): --  RR: 21 (12 Apr 2020 07:33) (21 - 23)  SpO2: 100% (12 Apr 2020 07:33) (95% - 100%)    Physical Exam  Gen: WD, cachectic, NAD  HENT: dry mucous membranes  Neck: Trachea midline  CV: RRR, no r/g/m  Pulm: Anterior and lateral lung fields clear to auscultation  Abd: Soft, NT, ND  Ext: No LE edema  Neuro: Nonsensical verbalizations, localizes to light touch, moves all four limbs spontaneously    MEDICATIONS  (STANDING):  AQUAPHOR (petrolatum Ointment) 1 Application(s) Topical at bedtime  aspirin enteric coated 81 milliGRAM(s) Oral daily  buPROPion XL . 150 milliGRAM(s) Oral daily  dextrose 5%. 1000 milliLiter(s) (50 mL/Hr) IV Continuous <Continuous>  dextrose 50% Injectable 12.5 Gram(s) IV Push once  dextrose 50% Injectable 25 Gram(s) IV Push once  dextrose 50% Injectable 25 Gram(s) IV Push once  heparin  Injectable 5000 Unit(s) SubCutaneous every 12 hours  insulin glargine Injectable (LANTUS) 10 Unit(s) SubCutaneous at bedtime  insulin lispro (HumaLOG) corrective regimen sliding scale   SubCutaneous three times a day before meals  insulin lispro (HumaLOG) corrective regimen sliding scale   SubCutaneous at bedtime  memantine 10 milliGRAM(s) Oral two times a day  QUEtiapine 25 milliGRAM(s) Oral daily  sodium chloride 0.9%. 1000 milliLiter(s) (100 mL/Hr) IV Continuous <Continuous>  trimethoprim   80 mG/sulfamethoxazole 400 mG 1 Tablet(s) Oral daily    MEDICATIONS  (PRN):  acetaminophen   Tablet .. 650 milliGRAM(s) Oral every 4 hours PRN Temp greater or equal to 38.5C (101.3F)  acetaminophen  Suppository .. 650 milliGRAM(s) Rectal every 6 hours PRN Temp greater or equal to 38C (100.4F)  ALBUTerol    90 MICROgram(s) HFA Inhaler 2 Puff(s) Inhalation every 4 hours PRN Shortness of Breath and/or Wheezing  benzonatate 100 milliGRAM(s) Oral three times a day PRN Cough  dextrose 40% Gel 15 Gram(s) Oral once PRN Blood Glucose LESS THAN 70 milliGRAM(s)/deciliter  glucagon  Injectable 1 milliGRAM(s) IntraMuscular once PRN Glucose LESS THAN 70 milligrams/deciliter  haloperidol    Injectable 0.5 milliGRAM(s) IV Push every 12 hours PRN agitation      LABS:  cret                        13.0   10.16 )-----------( 227      ( 11 Apr 2020 08:44 )             42.1     04-11    152<H>  |  117<H>  |  59<H>  ----------------------------<  147<H>  4.0   |  26  |  1.38<H>    Ca    10.1      11 Apr 2020 08:44

## 2020-04-12 NOTE — PROGRESS NOTE ADULT - SUBJECTIVE AND OBJECTIVE BOX
Spoke with patient's daughter, Stacey, regarding her current status. Patient currently improving with her oxygen saturations. All questions from bother her and her  were answered.

## 2020-04-13 DIAGNOSIS — E87.0 HYPEROSMOLALITY AND HYPERNATREMIA: ICD-10-CM

## 2020-04-13 LAB
ANION GAP SERPL CALC-SCNC: 7 MMOL/L — SIGNIFICANT CHANGE UP (ref 5–17)
ANION GAP SERPL CALC-SCNC: 8 MMOL/L — SIGNIFICANT CHANGE UP (ref 5–17)
BUN SERPL-MCNC: 53 MG/DL — HIGH (ref 7–23)
BUN SERPL-MCNC: 56 MG/DL — HIGH (ref 7–23)
CALCIUM SERPL-MCNC: 10.5 MG/DL — HIGH (ref 8.5–10.1)
CALCIUM SERPL-MCNC: 10.7 MG/DL — HIGH (ref 8.5–10.1)
CHLORIDE SERPL-SCNC: 127 MMOL/L — HIGH (ref 96–108)
CHLORIDE SERPL-SCNC: 130 MMOL/L — HIGH (ref 96–108)
CO2 SERPL-SCNC: 26 MMOL/L — SIGNIFICANT CHANGE UP (ref 22–31)
CO2 SERPL-SCNC: 27 MMOL/L — SIGNIFICANT CHANGE UP (ref 22–31)
CREAT SERPL-MCNC: 1.41 MG/DL — HIGH (ref 0.5–1.3)
CREAT SERPL-MCNC: 1.46 MG/DL — HIGH (ref 0.5–1.3)
CRP SERPL-MCNC: 11.53 MG/DL — HIGH (ref 0–0.4)
FERRITIN SERPL-MCNC: 756 NG/ML — HIGH (ref 15–150)
GLUCOSE SERPL-MCNC: 133 MG/DL — HIGH (ref 70–99)
GLUCOSE SERPL-MCNC: 146 MG/DL — HIGH (ref 70–99)
HCT VFR BLD CALC: 42.5 % — SIGNIFICANT CHANGE UP (ref 34.5–45)
HGB BLD-MCNC: 12.8 G/DL — SIGNIFICANT CHANGE UP (ref 11.5–15.5)
MAGNESIUM SERPL-MCNC: 2.7 MG/DL — HIGH (ref 1.6–2.6)
MCHC RBC-ENTMCNC: 30.1 GM/DL — LOW (ref 32–36)
MCHC RBC-ENTMCNC: 30.1 PG — SIGNIFICANT CHANGE UP (ref 27–34)
MCV RBC AUTO: 100 FL — SIGNIFICANT CHANGE UP (ref 80–100)
PHOSPHATE SERPL-MCNC: 3.3 MG/DL — SIGNIFICANT CHANGE UP (ref 2.5–4.5)
PLATELET # BLD AUTO: 237 K/UL — SIGNIFICANT CHANGE UP (ref 150–400)
POTASSIUM SERPL-MCNC: 3.9 MMOL/L — SIGNIFICANT CHANGE UP (ref 3.5–5.3)
POTASSIUM SERPL-MCNC: 4.1 MMOL/L — SIGNIFICANT CHANGE UP (ref 3.5–5.3)
POTASSIUM SERPL-SCNC: 3.9 MMOL/L — SIGNIFICANT CHANGE UP (ref 3.5–5.3)
POTASSIUM SERPL-SCNC: 4.1 MMOL/L — SIGNIFICANT CHANGE UP (ref 3.5–5.3)
PROCALCITONIN SERPL-MCNC: 0.14 NG/ML — HIGH (ref 0.02–0.1)
RBC # BLD: 4.25 M/UL — SIGNIFICANT CHANGE UP (ref 3.8–5.2)
RBC # FLD: 11.9 % — SIGNIFICANT CHANGE UP (ref 10.3–14.5)
SODIUM SERPL-SCNC: 162 MMOL/L — CRITICAL HIGH (ref 135–145)
SODIUM SERPL-SCNC: 163 MMOL/L — CRITICAL HIGH (ref 135–145)
WBC # BLD: 10.47 K/UL — SIGNIFICANT CHANGE UP (ref 3.8–10.5)
WBC # FLD AUTO: 10.47 K/UL — SIGNIFICANT CHANGE UP (ref 3.8–10.5)

## 2020-04-13 PROCEDURE — 99232 SBSQ HOSP IP/OBS MODERATE 35: CPT | Mod: CS,GC

## 2020-04-13 RX ORDER — SODIUM CHLORIDE 9 MG/ML
1000 INJECTION, SOLUTION INTRAVENOUS
Refills: 0 | Status: DISCONTINUED | OUTPATIENT
Start: 2020-04-13 | End: 2020-04-15

## 2020-04-13 RX ORDER — SODIUM CHLORIDE 9 MG/ML
1000 INJECTION, SOLUTION INTRAVENOUS
Refills: 0 | Status: DISCONTINUED | OUTPATIENT
Start: 2020-04-13 | End: 2020-04-13

## 2020-04-13 RX ADMIN — HEPARIN SODIUM 5000 UNIT(S): 5000 INJECTION INTRAVENOUS; SUBCUTANEOUS at 23:28

## 2020-04-13 RX ADMIN — Medication 1 APPLICATION(S): at 23:28

## 2020-04-13 RX ADMIN — Medication 1 PATCH: at 17:30

## 2020-04-13 RX ADMIN — Medication 1 PATCH: at 19:20

## 2020-04-13 RX ADMIN — INSULIN GLARGINE 10 UNIT(S): 100 INJECTION, SOLUTION SUBCUTANEOUS at 23:28

## 2020-04-13 RX ADMIN — SODIUM CHLORIDE 50 MILLILITER(S): 9 INJECTION, SOLUTION INTRAVENOUS at 09:00

## 2020-04-13 NOTE — PROGRESS NOTE ADULT - PROBLEM SELECTOR PLAN 1
-Secondary to COVID-19 PNA  -Supportive care with oxygen PRN. Currently requiring nasal cannula 2L  -Continue tylenol PRN fever, albuterol PRN dyspnea, and benzonatate PRN cough  -Manage COVID-19 as below -Secondary to COVID-19 PNA  -Supportive care with oxygen PRN. Currently requiring nasal cannula 6L  -Continue tylenol PRN fever, albuterol PRN dyspnea, and benzonatate PRN cough  -Manage COVID-19 as below

## 2020-04-13 NOTE — PROGRESS NOTE ADULT - PROBLEM SELECTOR PLAN 5
-Continue to attempt administration of memantine and seroquel -Likely secondary to COVID-19 PNA in the setting of poor reserve due to baseline dementia  -Refusing seroquel. Has not required PRN haldol in the past 24 hours  -Continue soft mittens to avoid patient removing nasal cannula  -Monitor mental status

## 2020-04-13 NOTE — PROGRESS NOTE ADULT - PROBLEM SELECTOR PLAN 3
-HgbA1c 9.0  -Continue Lantus 10U qhs  -Continue accuchecks with ISS TID AC and qhs -Likely secondary to refusal of PO intake  -Switched IV fluids from D5 50cc/hr to 1/2NS 50cc/hr  -Trend Na

## 2020-04-13 NOTE — PROGRESS NOTE ADULT - PROBLEM SELECTOR PLAN 2
-COVID-19 PNA causing acute hypoxemic respiratory failure  -Manage respiratory failure as above  -S/p plaquenil 4/6-4/10  -Discontinuing ceftriaxone as superimposed bacterial PNA unlikely -COVID-19 PNA causing acute hypoxemic respiratory failure  -Manage respiratory failure as above  -S/p plaquenil 4/6-4/10

## 2020-04-13 NOTE — PROGRESS NOTE ADULT - PROBLEM SELECTOR PLAN 4
-Likely secondary to COVID-19 PNA in the setting of poor reserve due to baseline dementia  -Refusing seroquel. Has not required PRN haldol in the past 24 hours  -Continue soft mittens to avoid patient removing nasal cannula  -Monitor mental status -HgbA1c 9.0  -Continue Lantus 10U qhs  -Continue accuchecks with ISS TID AC and qhs

## 2020-04-13 NOTE — PROGRESS NOTE ADULT - PROBLEM SELECTOR PLAN 7
-Continue to attempt administration of bactrim for UTI ppx -YOHANA on CKD3 (b/l Cr 1?)  -Likely due to decreased oral intake  -Cr improving  -IV NS increased to 100cc/hr  -Trend Cr

## 2020-04-13 NOTE — PROGRESS NOTE ADULT - ASSESSMENT
89F from [place] [facility] presented for anorexia and was found to have acute hypoxic respiratory failure secondary to COVID-19 PNA.

## 2020-04-13 NOTE — PROGRESS NOTE ADULT - SUBJECTIVE AND OBJECTIVE BOX
89F from [facility] presented for anorexia and was found to have acute hypoxic respiratory failure secondary to COVID-19 PNA.    4/13/20: Patient evaluated at bedside. Non-verbal. Localizes to light touch. Patient currently requiring 6L nasal cannula    Vital Signs Last 24 Hrs  T(C): 36.6 (13 Apr 2020 09:48), Max: 37.5 (12 Apr 2020 15:54)  T(F): 97.8 (13 Apr 2020 09:48), Max: 99.5 (12 Apr 2020 15:54)  HR: 91 (13 Apr 2020 09:48) (70 - 91)  BP: 136/88 (13 Apr 2020 09:48) (136/88 - 179/83)  BP(mean): --  RR: 20 (13 Apr 2020 09:48) (20 - 23)  SpO2: 91% (13 Apr 2020 09:48) (90% - 93%)    Physical Exam  Gen: WD, cachectic, NAD  HENT: dry mucous membranes  Neck: Trachea midline  CV: RRR, no r/g/m  Pulm: Anterior and lateral lung fields clear to auscultation  Abd: Soft, NT, ND  Ext: No LE edema  Neuro: Nonsensical verbalizations, localizes to light touch, moves all four limbs spontaneousl Walk in 89F from [facility] presented for anorexia and was found to have acute hypoxic respiratory failure secondary to COVID-19 PNA.    4/13/20: Patient evaluated at bedside. Non-verbal. Localizes to light touch. Patient is not accepting any PO intake. Patient currently requiring 6L nasal cannula.    Vital Signs Last 24 Hrs  T(C): 36.6 (13 Apr 2020 09:48), Max: 37.5 (12 Apr 2020 15:54)  T(F): 97.8 (13 Apr 2020 09:48), Max: 99.5 (12 Apr 2020 15:54)  HR: 91 (13 Apr 2020 09:48) (70 - 91)  BP: 136/88 (13 Apr 2020 09:48) (136/88 - 179/83)  BP(mean): --  RR: 20 (13 Apr 2020 09:48) (20 - 23)  SpO2: 91% (13 Apr 2020 09:48) (90% - 93%)    Physical Exam  Gen: WD, cachectic, NAD  HENT: dry mucous membranes  Neck: Trachea midline  CV: RRR, no r/g/m  Pulm: Anterior and lateral lung fields clear to auscultation  Abd: Soft, NT, ND  Ext: No LE edema  Neuro: Nonsensical verbalizations, localizes to light touch, moves all four limbs spontaneous    MEDICATIONS  (STANDING):  AQUAPHOR (petrolatum Ointment) 1 Application(s) Topical at bedtime  aspirin enteric coated 81 milliGRAM(s) Oral daily  buPROPion XL . 150 milliGRAM(s) Oral daily  dextrose 50% Injectable 12.5 Gram(s) IV Push once  dextrose 50% Injectable 25 Gram(s) IV Push once  dextrose 50% Injectable 25 Gram(s) IV Push once  heparin  Injectable 5000 Unit(s) SubCutaneous every 12 hours  insulin glargine Injectable (LANTUS) 10 Unit(s) SubCutaneous at bedtime  insulin lispro (HumaLOG) corrective regimen sliding scale   SubCutaneous three times a day before meals  insulin lispro (HumaLOG) corrective regimen sliding scale   SubCutaneous at bedtime  memantine 10 milliGRAM(s) Oral two times a day  QUEtiapine 25 milliGRAM(s) Oral daily  sodium chloride 0.45%. 1000 milliLiter(s) (50 mL/Hr) IV Continuous <Continuous>  trimethoprim   80 mG/sulfamethoxazole 400 mG 1 Tablet(s) Oral daily    MEDICATIONS  (PRN):  acetaminophen   Tablet .. 650 milliGRAM(s) Oral every 4 hours PRN Temp greater or equal to 38.5C (101.3F)  acetaminophen  Suppository .. 650 milliGRAM(s) Rectal every 6 hours PRN Temp greater or equal to 38C (100.4F)  ALBUTerol    90 MICROgram(s) HFA Inhaler 2 Puff(s) Inhalation every 4 hours PRN Shortness of Breath and/or Wheezing  benzonatate 100 milliGRAM(s) Oral three times a day PRN Cough  dextrose 40% Gel 15 Gram(s) Oral once PRN Blood Glucose LESS THAN 70 milliGRAM(s)/deciliter  glucagon  Injectable 1 milliGRAM(s) IntraMuscular once PRN Glucose LESS THAN 70 milligrams/deciliter  haloperidol    Injectable 0.5 milliGRAM(s) IV Push every 12 hours PRN agitation      LABS:  cret                        12.8   10.47 )-----------( 237      ( 13 Apr 2020 07:14 )             42.5     04-13    162<HH>  |  127<H>  |  53<H>  ----------------------------<  146<H>  3.9   |  27  |  1.46<H>    Ca    10.7<H>      13 Apr 2020 07:14  Phos  3.3     04-13  Mg     2.7     04-13

## 2020-04-13 NOTE — PROGRESS NOTE ADULT - PROBLEM SELECTOR PLAN 6
-YOHANA on CKD3 (b/l Cr 1?)  -Likely due to decreased oral intake  -Cr improving  -IV NS increased to 100cc/hr  -Trend Cr -Continue to attempt administration of memantine and seroquel

## 2020-04-14 LAB
ADD ON TEST-SPECIMEN IN LAB: SIGNIFICANT CHANGE UP
ANION GAP SERPL CALC-SCNC: 7 MMOL/L — SIGNIFICANT CHANGE UP (ref 5–17)
BUN SERPL-MCNC: 55 MG/DL — HIGH (ref 7–23)
CALCIUM SERPL-MCNC: 10.4 MG/DL — HIGH (ref 8.5–10.1)
CHLORIDE SERPL-SCNC: 128 MMOL/L — HIGH (ref 96–108)
CO2 SERPL-SCNC: 27 MMOL/L — SIGNIFICANT CHANGE UP (ref 22–31)
CREAT SERPL-MCNC: 1.39 MG/DL — HIGH (ref 0.5–1.3)
D DIMER BLD IA.RAPID-MCNC: 449 NG/ML DDU — HIGH
GLUCOSE SERPL-MCNC: 102 MG/DL — HIGH (ref 70–99)
POTASSIUM SERPL-MCNC: 3.8 MMOL/L — SIGNIFICANT CHANGE UP (ref 3.5–5.3)
POTASSIUM SERPL-SCNC: 3.8 MMOL/L — SIGNIFICANT CHANGE UP (ref 3.5–5.3)
SODIUM SERPL-SCNC: 162 MMOL/L — CRITICAL HIGH (ref 135–145)

## 2020-04-14 PROCEDURE — 99232 SBSQ HOSP IP/OBS MODERATE 35: CPT | Mod: CS,GC

## 2020-04-14 PROCEDURE — 99497 ADVNCD CARE PLAN 30 MIN: CPT | Mod: 25

## 2020-04-14 PROCEDURE — 99223 1ST HOSP IP/OBS HIGH 75: CPT

## 2020-04-14 RX ORDER — HALOPERIDOL DECANOATE 100 MG/ML
0.5 INJECTION INTRAMUSCULAR ONCE
Refills: 0 | Status: COMPLETED | OUTPATIENT
Start: 2020-04-14 | End: 2020-04-14

## 2020-04-14 RX ORDER — HALOPERIDOL DECANOATE 100 MG/ML
0.5 INJECTION INTRAMUSCULAR EVERY 6 HOURS
Refills: 0 | Status: DISCONTINUED | OUTPATIENT
Start: 2020-04-14 | End: 2020-04-15

## 2020-04-14 RX ORDER — HYDROMORPHONE HYDROCHLORIDE 2 MG/ML
0.2 INJECTION INTRAMUSCULAR; INTRAVENOUS; SUBCUTANEOUS
Refills: 0 | Status: DISCONTINUED | OUTPATIENT
Start: 2020-04-14 | End: 2020-04-15

## 2020-04-14 RX ADMIN — HALOPERIDOL DECANOATE 0.5 MILLIGRAM(S): 100 INJECTION INTRAMUSCULAR at 09:30

## 2020-04-14 RX ADMIN — SODIUM CHLORIDE 75 MILLILITER(S): 9 INJECTION, SOLUTION INTRAVENOUS at 16:51

## 2020-04-14 RX ADMIN — HEPARIN SODIUM 5000 UNIT(S): 5000 INJECTION INTRAVENOUS; SUBCUTANEOUS at 09:29

## 2020-04-14 RX ADMIN — SODIUM CHLORIDE 75 MILLILITER(S): 9 INJECTION, SOLUTION INTRAVENOUS at 00:19

## 2020-04-14 RX ADMIN — Medication 1 PATCH: at 11:15

## 2020-04-14 RX ADMIN — Medication 1 APPLICATION(S): at 22:35

## 2020-04-14 RX ADMIN — HALOPERIDOL DECANOATE 0.5 MILLIGRAM(S): 100 INJECTION INTRAMUSCULAR at 00:20

## 2020-04-14 RX ADMIN — HALOPERIDOL DECANOATE 0.5 MILLIGRAM(S): 100 INJECTION INTRAMUSCULAR at 11:09

## 2020-04-14 RX ADMIN — Medication 1 PATCH: at 06:15

## 2020-04-14 NOTE — PROGRESS NOTE ADULT - PROBLEM SELECTOR PROBLEM 10
Advance directive indicates patient wish for do-not-intubate resuscitation status
Advance directive indicates patient wish for do-not-intubate resuscitation status

## 2020-04-14 NOTE — PROGRESS NOTE ADULT - ASSESSMENT
89F from [place] [facility] presented for anorexia and was found to have acute hypoxic respiratory failure secondary to COVID-19 PNA.

## 2020-04-14 NOTE — PROGRESS NOTE ADULT - PROBLEM SELECTOR PLAN 3
-Likely secondary to refusal of PO intake  -Appreciate palliative care consult - transitioning to comfort care. Will continue fluids, but discontinue lab tests to monitor sodium

## 2020-04-14 NOTE — PROGRESS NOTE ADULT - SUBJECTIVE AND OBJECTIVE BOX
89F from [facility] presented for anorexia and was found to have acute hypoxic respiratory failure secondary to COVID-19 PNA.    4/14/20: Patient evaluated at bedside. Non-verbal. Localizes to light touch. Patient is not accepting any PO intake. Patient currently requiring NRB.    Vital Signs Last 24 Hrs  T(C): 37.1 (14 Apr 2020 00:50), Max: 37.1 (14 Apr 2020 00:50)  T(F): 98.7 (14 Apr 2020 00:50), Max: 98.7 (14 Apr 2020 00:50)  HR: 69 (14 Apr 2020 08:15) (69 - 78)  BP: 166/67 (14 Apr 2020 11:10) (142/70 - 170/71)  BP(mean): --  RR: 20 (14 Apr 2020 09:50) (20 - 20)  SpO2: 95% (14 Apr 2020 09:50) (85% - 95%)    Physical Exam  Gen: WD, cachectic, NAD  HENT: dry mucous membranes  Neck: Trachea midline  CV: RRR, no r/g/m  Pulm: Anterior and lateral lung fields clear to auscultation  Abd: Soft, NT, ND  Ext: No LE edema  Neuro: Nonsensical verbalizations, localizes to light touch, moves all four limbs spontaneous    MEDICATIONS  (STANDING):  AQUAPHOR (petrolatum Ointment) 1 Application(s) Topical at bedtime  cloNIDine Patch 0.1 mG/24Hr(s) 1 patch Transdermal every 7 days  dextrose 50% Injectable 12.5 Gram(s) IV Push once  dextrose 50% Injectable 25 Gram(s) IV Push once  dextrose 50% Injectable 25 Gram(s) IV Push once  sodium chloride 0.45%. 1000 milliLiter(s) (75 mL/Hr) IV Continuous <Continuous>    MEDICATIONS  (PRN):  acetaminophen  Suppository .. 650 milliGRAM(s) Rectal every 6 hours PRN Temp greater or equal to 38C (100.4F)  ALBUTerol    90 MICROgram(s) HFA Inhaler 2 Puff(s) Inhalation every 4 hours PRN Shortness of Breath and/or Wheezing  dextrose 40% Gel 15 Gram(s) Oral once PRN Blood Glucose LESS THAN 70 milliGRAM(s)/deciliter  glucagon  Injectable 1 milliGRAM(s) IntraMuscular once PRN Glucose LESS THAN 70 milligrams/deciliter  haloperidol    Injectable 0.5 milliGRAM(s) IV Push every 6 hours PRN Agitation  HYDROmorphone  Injectable 0.2 milliGRAM(s) IV Push every 3 hours PRN dyspnea or pain      LABS:  cret                        12.8   10.47 )-----------( 237      ( 13 Apr 2020 07:14 )             42.5     04-14    162<HH>  |  128<H>  |  55<H>  ----------------------------<  102<H>  3.8   |  27  |  1.39<H>    Ca    10.4<H>      14 Apr 2020 08:19  Phos  3.3     04-13  Mg     2.7     04-13    TPro  x   /  Alb  2.7<L>  /  TBili  x   /  DBili  x   /  AST  x   /  ALT  x   /  AlkPhos  x   04-14

## 2020-04-14 NOTE — CONSULT NOTE ADULT - CONSULT REQUESTED BY NAME
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    Medication verified and med list updated.  PCP and Pharmacy verified.    Social History     Tobacco Use   Smoking Status Former Smoker   • Packs/day: 1.00   • Years: 45.00   • Pack years: 45.00   • Types: Cigarettes   • Last attempt to quit: 3/6/2019   • Years since quittin.2   Smokeless Tobacco Never Used   Tobacco Comment    Patient declined smoking cessation material     Advance Directives Filed: No    ECO - Fully active, able to carry on all predisease activities without restrictions.    Height: No.  Ht Readings from Last 1 Encounters:   19 6' 2\" (1.88 m)     Weight:Yes, shoes on.  Wt Readings from Last 3 Encounters:   19 125.5 kg   19 124 kg   19 123 kg       BMI: There is no height or weight on file to calculate BMI.    PSYCHIATRIC: Patient denies insomnia, anxiety, but complains of: depression

## 2020-04-14 NOTE — CONSULT NOTE ADULT - SUBJECTIVE AND OBJECTIVE BOX
HPI: Ms. Mayberry is a 89y old Female coming from [facility] dementia unit) with hx of Dementia (FAST 7a), DM, CKD3, chronic UTI on suppression with bactrim, Depression, HTN, L hip fx admitted on 4/6 with decreased po intake and found to have Bilat Pna.    Pt has hypoxia on room air to 89-90%.        PAIN: ( )Yes   ( x)No  Level:  Location:  Intensity:    /10  Quality:  Aggravating Factors:  Alleviating Factors:  Radiation:  Duration/Timing:  Impact on ADLs:    DYSPNEA: ( ) Yes  ( ) No  Level:    PAST MEDICAL & SURGICAL HISTORY:  Spinal stenosis  Diabetes  HTN (hypertension)  Dementia      SOCIAL HX:    Hx opiate tolerance ( )YES  ( )NO    Baseline ADLs  (Prior to Admission)- few falls  ( ) Independent   (x )Dependent    FAMILY HISTORY:  Not able to gather    Review of Systems:  Not able to gather      PHYSICAL EXAM:    Vital Signs Last 24 Hrs  T(C): 37.1 (14 Apr 2020 00:50), Max: 37.1 (14 Apr 2020 00:50)  T(F): 98.7 (14 Apr 2020 00:50), Max: 98.7 (14 Apr 2020 00:50)  HR: 69 (14 Apr 2020 08:15) (69 - 78)  BP: 166/67 (14 Apr 2020 11:10) (142/70 - 170/71)  RR: 20 (14 Apr 2020 09:50) (20 - 20)  SpO2: 95% (14 Apr 2020 09:50) (85% - 96%)  Daily     Daily     PPSV2: 20%  FAST: 7a    General: Elderly female, lying in bed,  appears comfortable, easily aggravated by touch, does not follow commands, but calms easily when not touched  Mental Status: unable to gather, lethargic  HEENT: NRB in place, dmm  Lungs: dec at bases but otherwise clear  Cardiac: + s1 s2 rrr  GI: soft, nt nd, +bs  : incontinent, diaper in place  MSK/skin: moves all extremities, mittens in place  Neuro: lethargic, does not follow commands, responds to touch, moving all limbs spontaneously      LABS:                        12.8   10.47 )-----------( 237      ( 13 Apr 2020 07:14 )             42.5     04-14    162<HH>  |  128<H>  |  55<H>  ----------------------------<  102<H>  3.8   |  27  |  1.39<H>    Ca    10.4<H>      14 Apr 2020 08:19  Phos  3.3     04-13  Mg     2.7     04-13    TPro  x   /  Alb  2.7<L>  /  TBili  x   /  DBili  x   /  AST  x   /  ALT  x   /  AlkPhos  x   04-14      Albumin: Albumin, Serum: 2.7 g/dL (04-14 @ 08:19)      Allergies    No Known Allergies    Intolerances      MEDICATIONS  (STANDING):  AQUAPHOR (petrolatum Ointment) 1 Application(s) Topical at bedtime  aspirin enteric coated 81 milliGRAM(s) Oral daily  buPROPion XL . 150 milliGRAM(s) Oral daily  cloNIDine Patch 0.1 mG/24Hr(s) 1 patch Transdermal every 7 days  dextrose 50% Injectable 12.5 Gram(s) IV Push once  dextrose 50% Injectable 25 Gram(s) IV Push once  dextrose 50% Injectable 25 Gram(s) IV Push once  heparin  Injectable 5000 Unit(s) SubCutaneous every 12 hours  insulin glargine Injectable (LANTUS) 10 Unit(s) SubCutaneous at bedtime  insulin lispro (HumaLOG) corrective regimen sliding scale   SubCutaneous three times a day before meals  insulin lispro (HumaLOG) corrective regimen sliding scale   SubCutaneous at bedtime  memantine 10 milliGRAM(s) Oral two times a day  QUEtiapine 25 milliGRAM(s) Oral daily  sodium chloride 0.45%. 1000 milliLiter(s) (75 mL/Hr) IV Continuous <Continuous>  trimethoprim   80 mG/sulfamethoxazole 400 mG 1 Tablet(s) Oral daily    MEDICATIONS  (PRN):  acetaminophen   Tablet .. 650 milliGRAM(s) Oral every 4 hours PRN Temp greater or equal to 38.5C (101.3F)  acetaminophen  Suppository .. 650 milliGRAM(s) Rectal every 6 hours PRN Temp greater or equal to 38C (100.4F)  ALBUTerol    90 MICROgram(s) HFA Inhaler 2 Puff(s) Inhalation every 4 hours PRN Shortness of Breath and/or Wheezing  benzonatate 100 milliGRAM(s) Oral three times a day PRN Cough  dextrose 40% Gel 15 Gram(s) Oral once PRN Blood Glucose LESS THAN 70 milliGRAM(s)/deciliter  glucagon  Injectable 1 milliGRAM(s) IntraMuscular once PRN Glucose LESS THAN 70 milligrams/deciliter  haloperidol    Injectable 0.5 milliGRAM(s) IV Push every 12 hours PRN agitation      RADIOLOGY/ADDITIONAL STUDIES:    EXAM:  XR CHEST PORTABLE IMMED 1V                        PROCEDURE DATE:  04/06/2020    IMPRESSION:    Airspace opacity in the right lower lobe is present. Small left pleural effusion. Cardiomediastinal silhouette is intact.      MARIA GUADALUPE ZAMAN M.D., ATTENDING RADIOLOGIST  This document has been electronically signed. Apr 6 2020  5:10PM HPI: Ms. Mayberry is a 89y old Female coming from [facility] (dementia unit) with hx of Dementia (FAST 7a), DM, CKD3, chronic UTI on suppression with bactrim, Depression, HTN, L hip fx admitted on 4/6 with decreased po intake. Found to have hypoxia RA to 89-90% w/ CXR RLL PNA. Course c/b AMS requiring mittens, pharmaceutical restraint (IV haldol), and a/w refusal of all po meds and intake with YOHANA and persistent hypernatremia. Palliative Care consulted to assist with establishing GOC.     Met Ms. Mayberry this am, hospitalist team at bedside. Pt lethargic, restless when touched, unable to respond to questions or follow commands. Appears comfortable in mittens when not touched. Unable to gather history from pt.     Called daughter/HCP Stacey for more details and full GOC discussion. See GOC note that will follow for details.      PAIN: ( )Yes   ( x)No- no nonverbal signs of pain    DYSPNEA: ( ) Yes  (x ) No- no nonverbal signs of dyspnea    PAST MEDICAL & SURGICAL HISTORY:  Spinal stenosis  Diabetes  HTN (hypertension)  Dementia      SOCIAL HX: Lives at [facility], 2 children    Hx opiate tolerance ( )YES  (x )NO    Baseline ADLs  (Prior to Admission)- few falls, able to feed herself at baseline, but requires assistance for all other ADLs as per family  ( ) Independent   (x )Dependent    FAMILY HISTORY:  Not able to gather    Review of Systems:  Not able to gather      PHYSICAL EXAM:    Vital Signs Last 24 Hrs  T(C): 37.1 (14 Apr 2020 00:50), Max: 37.1 (14 Apr 2020 00:50)  T(F): 98.7 (14 Apr 2020 00:50), Max: 98.7 (14 Apr 2020 00:50)  HR: 69 (14 Apr 2020 08:15) (69 - 78)  BP: 166/67 (14 Apr 2020 11:10) (142/70 - 170/71)  RR: 20 (14 Apr 2020 09:50) (20 - 20)  SpO2: 95% (14 Apr 2020 09:50) (85% - 96%)  Daily     Daily     PPSV2: 20%  FAST: 7a    General: Elderly female, lying in bed,  appears comfortable, easily aggravated by touch, does not follow commands, but calms easily when not touched  Mental Status: unable to gather, lethargic  HEENT: NRB in place, dmm  Lungs: dec at bases but otherwise clear  Cardiac: + s1 s2 rrr  GI: soft, nt nd, +bs  : incontinent, diaper in place  MSK/skin: moves all extremities, mittens in place, muscle/fat wasting on exam  Neuro: lethargic, does not follow commands, responds to touch, moving all limbs spontaneously      LABS:                        12.8   10.47 )-----------( 237      ( 13 Apr 2020 07:14 )             42.5     04-14    162<HH>  |  128<H>  |  55<H>  ----------------------------<  102<H>  3.8   |  27  |  1.39<H>    Ca    10.4<H>      14 Apr 2020 08:19  Phos  3.3     04-13  Mg     2.7     04-13    TPro  x   /  Alb  2.7<L>  /  TBili  x   /  DBili  x   /  AST  x   /  ALT  x   /  AlkPhos  x   04-14      Albumin, Serum (04.14.20 @ 08:19)    Albumin, Serum: 2.7 g/dL      Allergies    No Known Allergies    Intolerances      MEDICATIONS  (STANDING):  AQUAPHOR (petrolatum Ointment) 1 Application(s) Topical at bedtime  aspirin enteric coated 81 milliGRAM(s) Oral daily  buPROPion XL . 150 milliGRAM(s) Oral daily  cloNIDine Patch 0.1 mG/24Hr(s) 1 patch Transdermal every 7 days  dextrose 50% Injectable 12.5 Gram(s) IV Push once  dextrose 50% Injectable 25 Gram(s) IV Push once  dextrose 50% Injectable 25 Gram(s) IV Push once  heparin  Injectable 5000 Unit(s) SubCutaneous every 12 hours  insulin glargine Injectable (LANTUS) 10 Unit(s) SubCutaneous at bedtime  insulin lispro (HumaLOG) corrective regimen sliding scale   SubCutaneous three times a day before meals  insulin lispro (HumaLOG) corrective regimen sliding scale   SubCutaneous at bedtime  memantine 10 milliGRAM(s) Oral two times a day  QUEtiapine 25 milliGRAM(s) Oral daily  sodium chloride 0.45%. 1000 milliLiter(s) (75 mL/Hr) IV Continuous <Continuous>  trimethoprim   80 mG/sulfamethoxazole 400 mG 1 Tablet(s) Oral daily    MEDICATIONS  (PRN):  acetaminophen   Tablet .. 650 milliGRAM(s) Oral every 4 hours PRN Temp greater or equal to 38.5C (101.3F)  acetaminophen  Suppository .. 650 milliGRAM(s) Rectal every 6 hours PRN Temp greater or equal to 38C (100.4F)  ALBUTerol    90 MICROgram(s) HFA Inhaler 2 Puff(s) Inhalation every 4 hours PRN Shortness of Breath and/or Wheezing  benzonatate 100 milliGRAM(s) Oral three times a day PRN Cough  dextrose 40% Gel 15 Gram(s) Oral once PRN Blood Glucose LESS THAN 70 milliGRAM(s)/deciliter  glucagon  Injectable 1 milliGRAM(s) IntraMuscular once PRN Glucose LESS THAN 70 milligrams/deciliter  haloperidol    Injectable 0.5 milliGRAM(s) IV Push every 12 hours PRN agitation      RADIOLOGY/ADDITIONAL STUDIES:    EXAM:  XR CHEST PORTABLE IMMED 1V                        PROCEDURE DATE:  04/06/2020    IMPRESSION:    Airspace opacity in the right lower lobe is present. Small left pleural effusion. Cardiomediastinal silhouette is intact.      MARIA GUADALUPE ZAMAN M.D., ATTENDING RADIOLOGIST  This document has been electronically signed. Apr 6 2020  5:10PM

## 2020-04-14 NOTE — PROGRESS NOTE ADULT - ATTENDING COMMENTS
Family Medicine Attending Addendum    MARI SWAN is a 89year old patient seen on rounds, interviewed and examined with Dr. Alonso. Medical Record reviewed. History, review of systems, physical findings and lab results as documented confirmed, except as modified by me. Agree with management plan as described except as modified below.
Family Medicine Attending Addendum    MARI SWAN is a 89year old patient seen on rounds, interviewed and examined with Dr. Adler. Medical Record reviewed. History, review of systems, physical findings and lab results as documented confirmed, except as modified by me. Agree with management plan as described except as modified below.
Family Medicine Attending Addendum    MARI SWAN is a 89year old patient seen on rounds, interviewed and examined with Dr. Adler. Medical Record reviewed. History, review of systems, physical findings and lab results as documented confirmed, except as modified by me. Agree with management plan as described except as modified below.
Family Medicine Attending Addendum    MARI SWAN is a 89year old patient seen on rounds, interviewed and examined with Dr. Marinelli. Medical Record reviewed. History, review of systems, physical findings and lab results as documented confirmed, except as modified by me. Agree with management plan as described except as modified below.
Family Medicine Attending Addendum    MARI SWAN is a 89year old patient seen on rounds, interviewed and examined with Dr. Alonso. Medical Record reviewed. History, review of systems, physical findings and lab results as documented confirmed, except as modified by me. Agree with management plan as described except as modified below.
Family Medicine Attending Addendum    MARI SWAN is a 89year old patient seen on rounds, interviewed and examined with Dr. Adler. Medical Record reviewed. History, review of systems, physical findings and lab results as documented confirmed, except as modified by me. Agree with management plan as described except as modified below.
Family Medicine Attending Addendum    MARI SWAN is a 89year old patient seen on rounds, interviewed and examined with Dr. Alonso. Medical Record reviewed. History, review of systems, physical findings and lab results as documented confirmed, except as modified by me. Agree with management plan as described except as modified below.
Family Medicine Attending Addendum    MARI SWAN is a 89year old patient seen on rounds, interviewed and examined with Dr. Adler. Medical Record reviewed. History, review of systems, physical findings and lab results as documented confirmed, except as modified by me. Agree with management plan as described except as modified below.

## 2020-04-14 NOTE — GOALS OF CARE CONVERSATION - ADVANCED CARE PLANNING - CONVERSATION DETAILS
Called Ms. Mayberry's daughter Stacey and her son Eliseo to discuss pt's medical issues and their overall wishes for her care. They explained that the pt has been living at the [facility] for the past 2 years. She is currently in their dementia unit for closer monitoring due to hx of falls. The pt needs assistance with all ADLs aside from feeding, still recognizes her family, and enjoys activities. However, if not prompted pt is not very active. They note her cognition has declined steadily over the years. They are aware of the natural progression of dementia, knowing that she would eventually come to the end of her life, though nonetheless saddened by the likelihood that this is happening now.     Reviewed pt's acute medical problems as well. The family was aware of her COVID-19 status, and that her major issue has actually not been this, but rather her AMS and refusal of po intake. They understand that the pt has not eaten and will not take any medications by mouth. Explained that while COVID mainly effects the lungs, noting PNA on CXR, the body must be viewed as a whole entity. Thus, they understood that a problem in one area, particularly for our older pt's with less reserve, often effects the body globally. It made sense to the family that infection in itself can cause delirium and that poor intake would cause stress on renal system, making it harder for the body to balance fluids and electrolytes- noting persistent hypernatremia that is also likely contributing to delirium. They were aware and continue to be in agreement with chemical restraint with IV haldol to keep pt calm and safe. Stacey noted being able to do a Zoom session with the pt this am, able to see for herself how the pt has declined even in the past few days.     Given above the family was open to suggestions about where to go from here. They shared their conversations as a family, interested in knowing more about palliative care and open to full discussion about the option of hospice. Reviewed tenets of palliative care, noting that in the setting of a chronic and progressive illness such as dementia the pt has actually been receiving palliative care for sometime (sx management and additional support). Used this explanation to discern between palliative care and hospice, noting that she is now at the point in her disease, due to both natural progression and foreseeable complications, clinicians are able to take a step back, view her full picture and note a prognosis that COULD be less than 6 months. The latter determination thus, making the pt hospice eligible. Reviewed the two levels of hospice care, specifically that due to inability to have sx managed by mouth, pt is not a candidate for home hospice services, but rather inpt hospice given her need for IV sx management. They understood this and agreed that hospice is the plan that they would prefer, eventually [facility] choice. They also understand that due to her COVID19 status there is no visitation at hospice, which they also would be weary of due to risk of exposure to the rest of the family. They explained that the pt's   with excellent hospice care 2 y ago in [place]. Explained that there are waiting lists for both hospices, recommending we institute comfort measures here including the following: no further blood draws, no imaging, continuing with IVF (though they know this will not be continued at hospice) and all other non-invasive measures that support pt's comfort. They agreed with this as well as confirming DNR and DNI status and no desire for PEG, and, lastly, that it was ok for floor JOLIE to send referral.     Ultimately, plan is for full comfort focus until accepted at [facility] and a bed is available. Shared above with floor RN and Dr. Adler, and floor SW who will continue to coordinate with hospice agency and family for dc. CTI completed and provided to JOLIE Rodriguez as well. Family was open to hearing from our team for further support while the pt is here, which we will most certainly oblige. In the interim we support any option for them to come see pt while they wait for placement.

## 2020-04-14 NOTE — PROGRESS NOTE ADULT - PROBLEM SELECTOR PLAN 1
-Secondary to COVID-19 PNA  -Supportive care with oxygen PRN. Currently requiring NRB  -Continue tylenol PRN fever, albuterol PRN dyspnea, and benzonatate PRN cough  -Manage COVID-19 as below

## 2020-04-14 NOTE — PROGRESS NOTE ADULT - PROBLEM SELECTOR PLAN 6
-Oral medications discontinued as patient refusing PO and has been transitioned to comfort care only

## 2020-04-14 NOTE — CONSULT NOTE ADULT - ASSESSMENT
89y old Female coming from [facility] (dementia unit) with hx of Dementia (FAST 7a), DM, CKD3, chronic UTI on suppression with bactrim, Depression, HTN, L hip fx admitted on 4/6 with decreased po intake. Found to have hypoxia RA to 89-90% w/ CXR RLL PNA. Course c/b AMS requiring mittens, pharmaceutical restraint (IV haldol), and a/w refusal of all po meds and intake with YOHANA and persistent hypernatremia. Palliative Care consulted to assist with establishing GOC.    1) AMS/Dementia  - underlying advanced dementia  - now further complicated by hyperactive delirium likely due to metabolic derangements  - fall and aspiration precautions  - ok to c/w mittens for safety  - c/w haldol 0.5 mg IV, will liberalize to q6h prn    2) Acute hypoxic respiratory failure  - due to COVID19 viral PNA  - imaging appreciated  - on supplemental O2  - s/p few doses of Plaquenil (some missed due to refusal)  - will add low dose IV dialudid in case of any dyspnea given pt not accepting po meds and comfort as focus of care    3) Malnutrition/Debility  - refusing all po intake  - evidence of malnutrition on exam  - multiple falls in the past as per family  - PT eval appreciated earlier in hospitalization noting rec for home with PT--> now not possible    4) YOHANA w/ hypernatremia  - prerenal in etiology  - due to poor po intake  - on D5 W, which family would like to continue while here   - they agree with no further blood draws and understand that IVF will not be continued at hospice due to risk of overload/ resulting dyspnea    5) Prognosis  - poor  - in setting of advanced age and advanced dementia (FAST 7a, prior to current decline), COVID 19 infection requiring NRB today, and refusal of all PO meds/nutrition with resulting YOHANA, PPS<40% at baseline, and need for IV sx management for agitation, pt fits criteria for inpt hospice.     6) GOC/Advanced Directives  - pt does not have capacity for decision making 2/2 to dementia  - HCP on file naming pt's children: 1) Stacey Garza [phone] and 2) Eliseo VASQUEZ on chart 4/7: DNR and DNI  - GOC conversation held today: plan for full comfort measures here including no blood draws or further imaging, continuing with fluids and other IV meds until accepted and bed available at [facility] inpt hospice. SW made aware and placing referral. Plan to continue to follow with you for support. *Spent 32 minutes discussing GOC with family including Advance care planning, explanation and discussion of advance directives, reviewed all treatment/dispo options, and MOLST. See GOC note for further details.    Thank you for including us in Ms. Mayberry's care. Will continue to follow with you.    Addy Richards MD  Palliative Care Attending

## 2020-04-14 NOTE — PROGRESS NOTE ADULT - PROBLEM SELECTOR PLAN 2
-COVID-19 PNA causing acute hypoxemic respiratory failure  -Manage respiratory failure as above  -S/p plaquenil 4/6-4/10

## 2020-04-15 VITALS — SYSTOLIC BLOOD PRESSURE: 141 MMHG | OXYGEN SATURATION: 90 % | HEART RATE: 60 BPM | DIASTOLIC BLOOD PRESSURE: 60 MMHG

## 2020-04-15 DIAGNOSIS — I10 ESSENTIAL (PRIMARY) HYPERTENSION: ICD-10-CM

## 2020-04-15 PROCEDURE — 99233 SBSQ HOSP IP/OBS HIGH 50: CPT

## 2020-04-15 PROCEDURE — 99239 HOSP IP/OBS DSCHRG MGMT >30: CPT | Mod: CS

## 2020-04-15 RX ORDER — ASPIRIN/CALCIUM CARB/MAGNESIUM 324 MG
1 TABLET ORAL
Qty: 0 | Refills: 0 | DISCHARGE

## 2020-04-15 RX ORDER — PETROLATUM,WHITE
1 JELLY (GRAM) TOPICAL
Qty: 0 | Refills: 0 | DISCHARGE

## 2020-04-15 RX ORDER — HYDROMORPHONE HYDROCHLORIDE 2 MG/ML
0.2 INJECTION INTRAMUSCULAR; INTRAVENOUS; SUBCUTANEOUS
Qty: 0 | Refills: 0 | DISCHARGE
Start: 2020-04-15

## 2020-04-15 RX ORDER — MEMANTINE HYDROCHLORIDE 10 MG/1
1 TABLET ORAL
Qty: 0 | Refills: 0 | DISCHARGE

## 2020-04-15 RX ORDER — METFORMIN HYDROCHLORIDE 850 MG/1
1 TABLET ORAL
Qty: 0 | Refills: 0 | DISCHARGE

## 2020-04-15 RX ORDER — QUETIAPINE FUMARATE 200 MG/1
1 TABLET, FILM COATED ORAL
Qty: 0 | Refills: 0 | DISCHARGE

## 2020-04-15 RX ORDER — ACETAMINOPHEN 500 MG
1 TABLET ORAL
Qty: 0 | Refills: 0 | DISCHARGE
Start: 2020-04-15

## 2020-04-15 RX ORDER — HALOPERIDOL DECANOATE 100 MG/ML
0.5 INJECTION INTRAMUSCULAR
Qty: 0 | Refills: 0 | DISCHARGE
Start: 2020-04-15

## 2020-04-15 RX ORDER — BUPROPION HYDROCHLORIDE 150 MG/1
1 TABLET, EXTENDED RELEASE ORAL
Qty: 0 | Refills: 0 | DISCHARGE

## 2020-04-15 RX ADMIN — SODIUM CHLORIDE 75 MILLILITER(S): 9 INJECTION, SOLUTION INTRAVENOUS at 05:39

## 2020-04-15 RX ADMIN — Medication 1 PATCH: at 05:41

## 2020-04-15 NOTE — DISCHARGE NOTE PROVIDER - CARE PROVIDER_API CALL
Inpatient Hospice Provider,   Patient will be managed by the inpatient hospice providers at the accepting institution  Phone: (   )    -  Fax: (   )    -  Follow Up Time:

## 2020-04-15 NOTE — DISCHARGE NOTE PROVIDER - NSDCMRMEDTOKEN_GEN_ALL_CORE_FT
acetaminophen 650 mg rectal suppository: 1 suppository(ies) rectal every 6 hours, As needed, Temp greater or equal to 38C (100.4F)  haloperidol: 0.5 milligram(s) intravenous every 6 hours, As Needed  HYDROmorphone: 0.2 milligram(s) intravenous every 3 hours, As Needed

## 2020-04-15 NOTE — DISCHARGE NOTE PROVIDER - HOSPITAL COURSE
89F from [place] assisted [facility] presented for anorexia and was found to have acute hypoxic respiratory failure secondary to COVID-19 PNA. She completed a plaquneil course and received supplemental oxygen. At the time of discharge, she is requiring a non-rebreather to maintain 92% or greater. Ms. Mayberry also has advanced dementia, and was noted during admission to be refusing all PO, and have incontinence of stool and urine, indicating end stage dementia. She developed hypernatremia secondary to lack of PO intake, which was managed with IV fluids. A goals of care conversation was held with the family, and it was decided to transition the patient to comfort directed care at an inpatient hospice.

## 2020-04-15 NOTE — PROGRESS NOTE ADULT - PROBLEM SELECTOR PLAN 5
-Continue clonidine patch to avoid hypertensive emergency/stroke, which could cause discomfort. Will likely be discontinued on discharge to hospice

## 2020-04-15 NOTE — PROGRESS NOTE ADULT - PROBLEM SELECTOR PLAN 1
-Secondary to COVID-19 PNA  -Supportive care with oxygen PRN. Currently requiring NRB  -Continue tylenol PRN fever, albuterol PRN dyspnea, and benzonatate PRN cough  -Manage COVID-19 as below  -Dilaudid PRN dyspnea or pain

## 2020-04-15 NOTE — DISCHARGE NOTE NURSING/CASE MANAGEMENT/SOCIAL WORK - PATIENT PORTAL LINK FT
You can access the FollowMyHealth Patient Portal offered by [facility] by registering at the following website: [email or web address]. By joining [facility]’s FollowMyHealth portal, you will also be able to view your health information using other applications (apps) compatible with our system.

## 2020-04-15 NOTE — PROGRESS NOTE ADULT - ASSESSMENT
89y old Female coming from [facility] (dementia unit) with hx of Dementia (FAST 7a), DM, CKD3, chronic UTI on suppression with bactrim, Depression, HTN, L hip fx admitted on 4/6 with decreased po intake. Found to have hypoxia RA to 89-90% w/ CXR RLL PNA. Course c/b AMS requiring mittens, pharmaceutical restraint (IV haldol), and a/w refusal of all po meds and intake with YOHANA and persistent hypernatremia. Palliative Care consulted to assist with establishing GOC.    1) AMS/Dementia  - underlying advanced dementia  - now further complicated by hyperactive delirium likely due to metabolic derangements  - fall and aspiration precautions  - ok to c/w mittens for safety  - c/w haldol 0.5 mg IV, will liberalize to q6h prn    2) Acute hypoxic respiratory failure  - due to COVID19 viral PNA  - imaging appreciated  - on supplemental O2  - s/p few doses of Plaquenil (some missed due to refusal)  - c/w low dose IV dialudid in case of any dyspnea given pt not accepting po meds and comfort as focus of care    3) Malnutrition/Debility  - refusing all po intake  - evidence of malnutrition on exam  - multiple falls in the past as per family  - PT eval appreciated earlier in hospitalization noting rec for home with PT--> now not possible    4) YOHANA w/ hypernatremia  - prerenal in etiology  - due to poor po intake  - on D5 W, which family would like to continue while here   - they agree with no further blood draws and understand that IVF will not be continued at hospice due to risk of overload/ resulting dyspnea    5) Prognosis  - poor  - in setting of advanced age and advanced dementia (FAST 7a, prior to current decline), COVID 19 infection requiring NRB today, and refusal of all PO meds/nutrition with resulting YOHANA, PPS<40% at baseline, and need for IV sx management for agitation, pt fits criteria for inpt hospice.     6) GOC/Advanced Directives  - pt does not have capacity for decision making 2/2 to dementia  - HCP on file naming pt's children: 1) Stacey Garza [phone] and 2) Eliseo VASQUEZ on chart 4/7: DNR and DNI  - GOC conversation held 4/14: plan for full comfort measures here including no blood draws or further imaging, continuing with fluids and other IV meds until accepted and bed available at [facility] inpt hospice. Bed available today and pt set up for 3pm transport, left message for family. See [facility] note for further details.    Thank you for including us in Ms. Mayberry's care. Will continue to follow with you.    Addy Richards MD  Palliative Care Attending

## 2020-04-15 NOTE — PROGRESS NOTE ADULT - SUBJECTIVE AND OBJECTIVE BOX
HPI: Pt seen and examined this am in follow up for sx and GOC. Obtunded, appears comfortable, unable to contribute to HPI. As per staff, [facility].     Called daughter, she did not answer, thus message was left to inform her.     PAIN: ( )Yes   ( x)No- no nonverbal signs of pain  DYSPNEA: ( ) Yes  (x ) No- no nonverbal signs of dyspnea    Review of Systems:  Not able to gather    PHYSICAL EXAM:    Vital Signs Last 24 Hrs  T(C): 36.3 (15 Apr 2020 00:35), Max: 36.3 (15 Apr 2020 00:35)  T(F): 97.3 (15 Apr 2020 00:35), Max: 97.3 (15 Apr 2020 00:35)  HR: 67 (15 Apr 2020 00:35) (67 - 67)  BP: 141/60 (15 Apr 2020 09:30) (141/60 - 165/56)  SpO2: 90% (15 Apr 2020 09:30) (90% - 96%)    PPSV2: 20%  FAST: 7a    General: Elderly female, lying in bed,  appears comfortable, moves some to touch, but obtunded and does not follow commands  Mental Status: unable to gather, lethargic  HEENT: NRB in place, dmm  Lungs: dec at bases but otherwise clear  Cardiac: + s1 s2 rrr  GI: soft, nt nd, +bs  : incontinent, diaper in place  MSK/skin: moves all extremities, mittens in place, muscle/fat wasting on exam  Neuro: lethargic, does not follow commands, responds to touch, moving all limbs spontaneously    LABS:    04-14    162<HH>  |  128<H>  |  55<H>  ----------------------------<  102<H>  3.8   |  27  |  1.39<H>    Ca    10.4<H>      14 Apr 2020 08:19    TPro  x   /  Alb  2.7<L>  /  TBili  x   /  DBili  x   /  AST  x   /  ALT  x   /  AlkPhos  x   04-14      Albumin: Albumin, Serum: 2.7 g/dL (04-14 @ 08:19)      Allergies    No Known Allergies    Intolerances      MEDICATIONS  (STANDING):  AQUAPHOR (petrolatum Ointment) 1 Application(s) Topical at bedtime  cloNIDine Patch 0.1 mG/24Hr(s) 1 patch Transdermal every 7 days  dextrose 50% Injectable 12.5 Gram(s) IV Push once  dextrose 50% Injectable 25 Gram(s) IV Push once  dextrose 50% Injectable 25 Gram(s) IV Push once  sodium chloride 0.45%. 1000 milliLiter(s) (75 mL/Hr) IV Continuous <Continuous>    MEDICATIONS  (PRN):  acetaminophen  Suppository .. 650 milliGRAM(s) Rectal every 6 hours PRN Temp greater or equal to 38C (100.4F)  ALBUTerol    90 MICROgram(s) HFA Inhaler 2 Puff(s) Inhalation every 4 hours PRN Shortness of Breath and/or Wheezing  dextrose 40% Gel 15 Gram(s) Oral once PRN Blood Glucose LESS THAN 70 milliGRAM(s)/deciliter  glucagon  Injectable 1 milliGRAM(s) IntraMuscular once PRN Glucose LESS THAN 70 milligrams/deciliter  haloperidol    Injectable 0.5 milliGRAM(s) IV Push every 6 hours PRN Agitation  HYDROmorphone  Injectable 0.2 milliGRAM(s) IV Push every 3 hours PRN dyspnea or pain

## 2020-04-15 NOTE — PROGRESS NOTE ADULT - ASSESSMENT
89F from [facility] presented for anorexia and was found to have acute hypoxic respiratory failure secondary to COVID-19 PNA. Currently requiring non-rebreather, is non-verbal and not accepting PO. Currently receiving comfort care while awaiting an inpatient hospice bed.

## 2020-04-15 NOTE — DISCHARGE NOTE PROVIDER - PROVIDER TOKENS
FREE:[LAST:[Inpatient Hospice Provider],PHONE:[(   )    -],FAX:[(   )    -],ADDRESS:[Patient will be managed by the inpatient hospice providers at the accepting institution]]

## 2020-04-15 NOTE — PROGRESS NOTE ADULT - SUBJECTIVE AND OBJECTIVE BOX
89F from [facility] presented for anorexia and was found to have acute hypoxic respiratory failure secondary to COVID-19 PNA.    4/15/20: Patient evaluated at bedside. Non-verbal. No non-verbal indicators of distress. Patient is not accepting any PO intake. Patient currently requiring NRB.    Vital Signs Last 24 Hrs  T(C): 36.3 (15 Apr 2020 00:35), Max: 36.3 (15 Apr 2020 00:35)  T(F): 97.3 (15 Apr 2020 00:35), Max: 97.3 (15 Apr 2020 00:35)  HR: 67 (15 Apr 2020 00:35) (67 - 67)  BP: 141/60 (15 Apr 2020 09:30) (141/60 - 165/56)  BP(mean): --  RR: --  SpO2: 90% (15 Apr 2020 09:30) (90% - 96%)    Physical Exam  Gen: WD, cachectic, NAD  HENT: dry mucous membranes  Neck: Trachea midline  CV: RRR, no r/g/m  Pulm: Anterior and lateral lung fields clear to auscultation  Abd: Soft, NT, ND  Ext: No LE edema  Neuro: Nonsensical verbalizations, localizes to light touch, moves all four limbs spontaneous  cret           MEDICATIONS  (STANDING):  AQUAPHOR (petrolatum Ointment) 1 Application(s) Topical at bedtime  cloNIDine Patch 0.1 mG/24Hr(s) 1 patch Transdermal every 7 days  dextrose 50% Injectable 12.5 Gram(s) IV Push once  dextrose 50% Injectable 25 Gram(s) IV Push once  dextrose 50% Injectable 25 Gram(s) IV Push once  sodium chloride 0.45%. 1000 milliLiter(s) (75 mL/Hr) IV Continuous <Continuous>    MEDICATIONS  (PRN):  acetaminophen  Suppository .. 650 milliGRAM(s) Rectal every 6 hours PRN Temp greater or equal to 38C (100.4F)  ALBUTerol    90 MICROgram(s) HFA Inhaler 2 Puff(s) Inhalation every 4 hours PRN Shortness of Breath and/or Wheezing  dextrose 40% Gel 15 Gram(s) Oral once PRN Blood Glucose LESS THAN 70 milliGRAM(s)/deciliter  glucagon  Injectable 1 milliGRAM(s) IntraMuscular once PRN Glucose LESS THAN 70 milligrams/deciliter  haloperidol    Injectable 0.5 milliGRAM(s) IV Push every 6 hours PRN Agitation  HYDROmorphone  Injectable 0.2 milliGRAM(s) IV Push every 3 hours PRN dyspnea or pain    Labs discontinued in the setting of comfort care

## 2020-04-15 NOTE — DISCHARGE NOTE PROVIDER - NSDCCPCAREPLAN_GEN_ALL_CORE_FT
PRINCIPAL DISCHARGE DIAGNOSIS  Diagnosis: Acute respiratory failure with hypoxia  Assessment and Plan of Treatment: Ms. Mayberry was admitted to the hospital for low oxygen secondary to COVID-19 pneumonia. At the time of discharge, she is still requiring non-rebreather to maintain oxygen. Please provide supplemental oxygen and morphine as needed for comfort.      SECONDARY DISCHARGE DIAGNOSES  Diagnosis: Pneumonia due to COVID-19 virus  Assessment and Plan of Treatment: See above    Diagnosis: Advanced dementia  Assessment and Plan of Treatment: Ms. Hurley is no longer accepting oral intake. Please provide care to optimize comfort per the family's wishes.

## 2020-04-15 NOTE — PROGRESS NOTE ADULT - REASON FOR ADMISSION
COVID  RLL Pna  Hypoxia  Decreased PO  CKD III  DM

## 2020-04-17 DIAGNOSIS — F03.90 UNSPECIFIED DEMENTIA WITHOUT BEHAVIORAL DISTURBANCE: ICD-10-CM

## 2020-04-17 DIAGNOSIS — E87.0 HYPEROSMOLALITY AND HYPERNATREMIA: ICD-10-CM

## 2020-04-17 DIAGNOSIS — U07.1 COVID-19: ICD-10-CM

## 2020-04-17 DIAGNOSIS — N17.9 ACUTE KIDNEY FAILURE, UNSPECIFIED: ICD-10-CM

## 2020-04-17 DIAGNOSIS — F32.9 MAJOR DEPRESSIVE DISORDER, SINGLE EPISODE, UNSPECIFIED: ICD-10-CM

## 2020-04-17 DIAGNOSIS — I10 ESSENTIAL (PRIMARY) HYPERTENSION: ICD-10-CM

## 2020-04-17 DIAGNOSIS — Z66 DO NOT RESUSCITATE: ICD-10-CM

## 2020-04-17 DIAGNOSIS — E11.22 TYPE 2 DIABETES MELLITUS WITH DIABETIC CHRONIC KIDNEY DISEASE: ICD-10-CM

## 2020-04-17 DIAGNOSIS — Z51.5 ENCOUNTER FOR PALLIATIVE CARE: ICD-10-CM

## 2020-04-17 DIAGNOSIS — Z79.82 LONG TERM (CURRENT) USE OF ASPIRIN: ICD-10-CM

## 2020-04-17 DIAGNOSIS — N39.0 URINARY TRACT INFECTION, SITE NOT SPECIFIED: ICD-10-CM

## 2020-04-17 DIAGNOSIS — N18.3 CHRONIC KIDNEY DISEASE, STAGE 3 (MODERATE): ICD-10-CM

## 2020-04-17 DIAGNOSIS — J12.89 OTHER VIRAL PNEUMONIA: ICD-10-CM

## 2020-04-17 DIAGNOSIS — E46 UNSPECIFIED PROTEIN-CALORIE MALNUTRITION: ICD-10-CM

## 2020-04-17 DIAGNOSIS — J96.01 ACUTE RESPIRATORY FAILURE WITH HYPOXIA: ICD-10-CM

## 2020-04-17 DIAGNOSIS — G93.41 METABOLIC ENCEPHALOPATHY: ICD-10-CM

## 2020-10-09 NOTE — ED PROVIDER NOTE - CARDIAC, MLM
Normal rate, regular rhythm.  Heart sounds S1, S2.  No murmurs, rubs or gallops. Normal radial pulse.
Never

## 2021-10-18 NOTE — PROGRESS NOTE ADULT - PROVIDER SPECIALTY LIST ADULT
Hospitalist I explained that the floaters will slowly fade over a period of months to years but will not likely disappear completely. They are usually well-tolerated. Intermittent flashes will fade over time as well and do not add worrisome significance.

## 2021-11-15 NOTE — PROGRESS NOTE ADULT - SUBJECTIVE AND OBJECTIVE BOX
89 y/o F with PMH of dementia, depression, HTN, spinal stenosis, DM2, p/w fall. Patient is a poor historian 2/2 dementia. Patient denies LOC, and only complaint is L hip pain. Found to have L acetabular fracture, and was evaluated by ortho at bedside. Ortho does not recommend surgical intervention at this time.      18: Patient seen and examined. No active issues.         Vital Signs Last 24 Hrs  T(C): 36.8 (20 Dec 2018 10:53), Max: 36.8 (20 Dec 2018 10:53)  T(F): 98.2 (20 Dec 2018 10:53), Max: 98.2 (20 Dec 2018 10:53)  HR: 64 (20 Dec 2018 10:53) (64 - 73)  BP: 121/37 (20 Dec 2018 10:53) (121/37 - 153/55)  BP(mean): 57 (20 Dec 2018 10:53) (57 - 57)  RR: 16 (20 Dec 2018 10:53) (16 - 18)  SpO2: 95% (20 Dec 2018 10:53) (90% - 95%)        Physical Exam:  · Constitutional	Well-developed, well nourished	  · Eyes	EOMI; PERRL; no drainage or redness	  · ENMT	No oral lesions; no gross abnormalities	  · Neck	No bruits; no thyromegaly or nodules	  · Respiratory	Breath Sounds equal & clear to percussion & auscultation, no accessory muscle use	  · Cardiovascular	Regular rate & rhythm, normal S1, S2; no murmurs, gallops or rubs; no S3, S4	  · Gastrointestinal	Soft, non-tender, no hepatosplenomegaly, normal bowel sounds	  · Extremities	detailed exam	  · Extremities Details	no clubbing; no cyanosis; no pedal edema	  · Neurological	detailed exam	  · Neurological Details	responds to pain; responds to verbal commands; sensation intact; deep reflexes intact; cranial nerves intact	  · Musculoskeletal	detailed exam	  · Musculoskeletal Details	decreased ROM due to pain; of LLE	        Labs:                         9.7    15.16 )-----------( 234      ( 20 Dec 2018 05:38 )             29.9     19 Dec 2018 06:38    140    |  105    |  26     ----------------------------<  221    4.5     |  27     |  1.21     Ca    8.9        19 Dec 2018 06:38  Phos  3.1       19 Dec 2018 06:38  Mg     2.0       19 Dec 2018 06:38    TPro  7.5    /  Alb  3.2    /  TBili  0.2    /  DBili  x      /  AST  15     /  ALT  22     /  AlkPhos  93     19 Dec 2018 06:38    LIVER FUNCTIONS - ( 19 Dec 2018 06:38 )  Alb: 3.2 g/dL / Pro: 7.5 gm/dL / ALK PHOS: 93 U/L / ALT: 22 U/L / AST: 15 U/L / GGT: x           PT/INR - ( 19 Dec 2018 06:38 )   PT: 12.0 sec;   INR: 1.08 ratio         PTT - ( 19 Dec 2018 06:38 )  PTT:29.1 sec  CAPILLARY BLOOD GLUCOSE      POCT Blood Glucose.: 186 mg/dL (20 Dec 2018 11:14)  POCT Blood Glucose.: 190 mg/dL (20 Dec 2018 07:54)  POCT Blood Glucose.: 165 mg/dL (19 Dec 2018 23:55)  POCT Blood Glucose.: 215 mg/dL (19 Dec 2018 17:02)        Urinalysis Basic - ( 19 Dec 2018 04:15 )    Color: Yellow / Appearance: very cloudy / S.015 / pH: x  Gluc: x / Ketone: Trace  / Bili: Negative / Urobili: 1 mg/dL   Blood: x / Protein: 30 mg/dL / Nitrite: Positive   Leuk Esterase: Small / RBC: 3-5 /HPF / WBC 3-5   Sq Epi: x / Non Sq Epi: Occasional / Bacteria: TNTC        MEDICATIONS:    buPROPion XL . 150 milliGRAM(s) Oral daily  cefTRIAXone Injectable.      cefTRIAXone Injectable. 1000 milliGRAM(s) IV Push every 24 hours  cholecalciferol 3000 Unit(s) Oral daily  dextrose 5%. 1000 milliLiter(s) (50 mL/Hr) IV Continuous <Continuous>  dextrose 50% Injectable 12.5 Gram(s) IV Push once  dextrose 50% Injectable 25 Gram(s) IV Push once  dextrose 50% Injectable 25 Gram(s) IV Push once  ibuprofen  Tablet. 400 milliGRAM(s) Oral every 8 hours  insulin lispro (HumaLOG) corrective regimen sliding scale   SubCutaneous three times a day before meals  melatonin 5 milliGRAM(s) Oral at bedtime  memantine 10 milliGRAM(s) Oral two times a day  mirtazapine 30 milliGRAM(s) Oral at bedtime  sodium chloride 0.9%. 500 milliLiter(s) (50 mL/Hr) IV Continuous <Continuous>    MEDICATIONS  (PRN):  acetaminophen   Tablet .. 650 milliGRAM(s) Oral every 4 hours PRN Temp greater or equal to 38C (100.4F), Mild Pain (1 - 3)  dextrose 40% Gel 15 Gram(s) Oral once PRN Blood Glucose LESS THAN 70 milliGRAM(s)/deciliter  docusate sodium 100 milliGRAM(s) Oral three times a day PRN Constipation  glucagon  Injectable 1 milliGRAM(s) IntraMuscular once PRN Glucose LESS THAN 70 milligrams/deciliter  morphine  - Injectable 2 milliGRAM(s) IV Push every 6 hours PRN Severe Pain (7 - 10)  oxyCODONE    IR 5 milliGRAM(s) Oral every 6 hours PRN Moderate Pain (4 - 6)          Assessment and Plan:   Assessment:  · Assessment		    89 y/o F with PMH of dementia, depression, HTN, spinal stenosis, DM2, p/w fall    *L acetablular fracture s/p fall + pubic ramus fracture   -Patient evalauted by ortho, and does not recommend surgical intervention   -PT consult appreciated  -Pain control     *UTI w/ leukocytosis  -Continue Ceftriaxone one more day  -Urine cx    *Dehydration: resolved  D/C IV fluid    *Anemia w/ L extraperitoneal hemorrhage along pelvic sidewall   -Trend H/H   -Trauma follow up appreciated  -ASA held temporarily     *Dementia / depression / HTN / spinal stenosis  -C/w home meds and if conditions remain stable during hospitalization -> f/u outpatient for further management   Supportive care    *8mm Pulmonary nodule on CT  -Outpatient follow up    *DM2  -Humalog ISS  -Diabetic diet
89 y/o F with PMH of dementia, depression, HTN, spinal stenosis, DM2, p/w fall. Patient is a poor historian 2/2 dementia. Patient denies LOC, and only complaint is L hip pain. Found to have L acetabular fracture, and was evaluated by ortho at bedside. Ortho does not recommend surgical intervention at this time.      12/20/18: Patient seen and examined. No active issues.   12/21/18: Patient seen and examined. Sitting in a chair, confused        Vital Signs Last 24 Hrs  T(C): 36.1 (21 Dec 2018 11:04), Max: 36.6 (20 Dec 2018 16:58)  T(F): 97 (21 Dec 2018 11:04), Max: 97.9 (20 Dec 2018 16:58)  HR: 61 (21 Dec 2018 11:04) (60 - 63)  BP: 111/46 (21 Dec 2018 11:04) (111/46 - 148/46)  BP(mean): --  RR: 16 (21 Dec 2018 11:04) (16 - 20)  SpO2: 95% (21 Dec 2018 11:04) (94% - 95%)        Physical Exam:  · Constitutional	Well-developed, well nourished	  · Eyes	EOMI; PERRL; no drainage or redness	  · ENMT	No oral lesions; no gross abnormalities	  · Neck	No bruits; no thyromegaly or nodules	  · Respiratory	Breath Sounds equal & clear to percussion & auscultation, no accessory muscle use	  · Cardiovascular	Regular rate & rhythm, normal S1, S2; no murmurs, gallops or rubs; no S3, S4	  · Gastrointestinal	Soft, non-tender, no hepatosplenomegaly, normal bowel sounds	  · Extremities	detailed exam	  · Extremities Details	no clubbing; no cyanosis; no pedal edema	  · Neurological	detailed exam	  · Neurological Details	responds to pain; responds to verbal commands; sensation intact; deep reflexes intact; cranial nerves intact	  · Musculoskeletal	detailed exam	  · Musculoskeletal Details	decreased ROM due to pain; of LLE	        Labs:                                             9.8    11.65 )-----------( 236      ( 21 Dec 2018 04:20 )             30.4         CAPILLARY BLOOD GLUCOSE      POCT Blood Glucose.: 161 mg/dL (21 Dec 2018 11:56)  POCT Blood Glucose.: 160 mg/dL (21 Dec 2018 07:36)  POCT Blood Glucose.: 164 mg/dL (20 Dec 2018 22:10)  POCT Blood Glucose.: 180 mg/dL (20 Dec 2018 17:05)              MEDICATIONS:    buPROPion XL . 150 milliGRAM(s) Oral daily  cefTRIAXone Injectable.      cefTRIAXone Injectable. 1000 milliGRAM(s) IV Push every 24 hours  cholecalciferol 3000 Unit(s) Oral daily  dextrose 5%. 1000 milliLiter(s) (50 mL/Hr) IV Continuous <Continuous>  dextrose 50% Injectable 12.5 Gram(s) IV Push once  dextrose 50% Injectable 25 Gram(s) IV Push once  dextrose 50% Injectable 25 Gram(s) IV Push once  ibuprofen  Tablet. 400 milliGRAM(s) Oral every 8 hours  insulin lispro (HumaLOG) corrective regimen sliding scale   SubCutaneous three times a day before meals  melatonin 5 milliGRAM(s) Oral at bedtime  memantine 10 milliGRAM(s) Oral two times a day  mirtazapine 30 milliGRAM(s) Oral at bedtime  sodium chloride 0.9%. 500 milliLiter(s) (50 mL/Hr) IV Continuous <Continuous>    MEDICATIONS  (PRN):  acetaminophen   Tablet .. 650 milliGRAM(s) Oral every 4 hours PRN Temp greater or equal to 38C (100.4F), Mild Pain (1 - 3)  dextrose 40% Gel 15 Gram(s) Oral once PRN Blood Glucose LESS THAN 70 milliGRAM(s)/deciliter  docusate sodium 100 milliGRAM(s) Oral three times a day PRN Constipation  glucagon  Injectable 1 milliGRAM(s) IntraMuscular once PRN Glucose LESS THAN 70 milligrams/deciliter  morphine  - Injectable 2 milliGRAM(s) IV Push every 6 hours PRN Severe Pain (7 - 10)  oxyCODONE    IR 5 milliGRAM(s) Oral every 6 hours PRN Moderate Pain (4 - 6)          Assessment and Plan:   Assessment:  · Assessment		    89 y/o F with PMH of dementia, depression, HTN, spinal stenosis, DM2, p/w fall    *L acetablular fracture s/p fall + pubic ramus fracture   -Patient evalauted by ortho, and does not recommend surgical intervention   -PT consult appreciated  -Pain control     *UTI w/ leukocytosis, resolving  -D/C Ceftriaxone  -Urine cx: never sent    *Dehydration: resolved  D/C IV fluid    *Anemia w/ L extraperitoneal hemorrhage along pelvic sidewall   -Trauma follow up appreciated  -ASA held temporarily     *Dementia / depression / HTN / spinal stenosis  -C/w home meds and if conditions remain stable during hospitalization -> f/u outpatient for further management   Supportive care    *8mm Pulmonary nodule on CT  -Outpatient follow up    *DM2  -Humalog ISS  -Diabetic diet    Possible d/c to rehab tomorrow.
Patient is a 89y old  Female who presents with a chief complaint of fall (19 Dec 2018 07:29)    HPI:  89 y/o F with PMH of dementia, depression, HTN, spinal stenosis, DM2, p/w fall. Patient is a poor historian 2/2 dementia. Patient denies LOC, and only complaint is L hip pain. Found to have L acetabular fracture, and was evaluated by ortho at bedside. Ortho does not recommend surgical intervention at this time.  Denies CP, nausea, vomiting, abdominal pain, SOB, cough, runny nose, sore throat, diarrhea, urinary complaints  12/19: Pt seen and examined, pain controlled, No headache, no neck pain. neurochecks stable, no sensory motor compliant. No SOB, no chest pain. No abdominal pain. No bony pelvis pain. Moves all extremities c/o left shoulder pain on movement , no focal neurological complaint. No fever.  ROS:.  [] A ten-point review of systems was otherwise negative except as noted.  Systemic:	[ ] Fever	[ ] Chills	[ ] Night sweats    [ ] Fatigue	[ ] Other  [] Cardiovascular:  [] Pulmonary:  [] Renal/Urologic:  [] Gastrointestinal: abdominal pain, vomiting  [] Metabolic:  [] Neurologic:  [] Hematologic:  [] ENT:  [] Ophthalmologic:  [] Musculoskeletal:    [ X] Due to altered mental status/intubation, subjective information were not able to be obtained from the patient. History was obtained, to the extent possible, from review of the chart and collateral sources of information.    PAST MEDICAL & SURGICAL HISTORY:  Spinal stenosis  Diabetes  HTN (hypertension)  Dementia  PSH: Denies    Social hx: Denies x 3, lives at [facility]    Family Hx: Denies (19 Dec 2018 05:22)      FAMILY HISTORY:  No pertinent family history in first degree relatives    [place]  Social history:     Alcohol: Denied  Smoking: Denied  Drug Use: Denied        Vital Signs Last 24 Hrs  T(C): 36.3 (19 Dec 2018 08:50), Max: 36.8 (19 Dec 2018 07:14)  T(F): 97.4 (19 Dec 2018 08:50), Max: 98.2 (19 Dec 2018 07:14)  HR: 66 (19 Dec 2018 08:50) (61 - 75)  BP: 144/72 (19 Dec 2018 08:50) (112/61 - 153/56)  BP(mean): 88 (19 Dec 2018 08:50) (88 - 88)  RR: 17 (19 Dec 2018 08:50) (16 - 20)  SpO2: 98% (19 Dec 2018 08:50) (95% - 98%)  PHYSICAL EXAM:  Constitutional: NAD, GCS: 14/15  AOX1  Eyes:  WNL  ENMT:  WNL  Neck:  WNL, non tender  Back: Non tender  Respiratory: CTABL  Cardiovascular:  S1+S2+0  Gastrointestinal: Soft, ND , NT  Genitourinary:  WNL  Extremities: NV intact, left pelvic tenderness left shoulder pain on movement.   Vascular:  Intact  Neurological: No focal neurological deficit,  CN, motor and sensory system grossly intact.  Skin: WNL  Musculoskeletal: as above  Psychiatric: Grossly WNL      Labs:                          10.3   14.99 )-----------( 269      ( 19 Dec 2018 06:38 )             31.8       12-19    140  |  105  |  26<H>  ----------------------------<  221<H>  4.5   |  27  |  1.21    Ca    8.9      19 Dec 2018 06:38  Phos  3.1     12-19  Mg     2.0     12-19    TPro  7.5  /  Alb  3.2<L>  /  TBili  0.2  /  DBili  x   /  AST  15  /  ALT  22  /  AlkPhos  93  12-19      PT/INR - ( 19 Dec 2018 06:38 )   PT: 12.0 sec;   INR: 1.08 ratio         PTT - ( 19 Dec 2018 06:38 )  PTT:29.1 sec    Radiology:    < from: Xray Hip w/ Pelvis 2 or 3 Views, Left (12.19.18 @ 07:33) >  MPRESSION:      1.  LEFT Hip:  No fracture.    2.  Pelvis:  The LEFT acetabular fracture seen on concurrent CT scan is   not well depicted on the x-rays. The nondisplaced acute fracture through   chronic fracture of the LEFT inferior pubic ramus is also not well seen.  < from: Xray Chest 1 View- PORTABLE-Urgent (12.19.18 @ 06:20) >      The lungs are clear.  No pleural abnormality is seen.      Cardiomediastinal silhouette is intact.    < from: Xray Shoulder 2 Views, Left (12.19.18 @ 06:12) >  FINDINGS/  IMPRESSION:   No prior examinations are available for review.    The osseous structures of the shoulder are intact.   No fracture is seen.    No destructive bone lesion is identified.    The glenohumeral joint is located and intact.  The acromioclavicular   joint appears aligned and intact.    No pathologic calcifications or other soft tissue abnormalities are seen.    The visualized chest wall and ribs are intact.  No radiopaque foreign   body is identified.       < from: CT Pelvis Bony Only No Cont (12.19.18 @ 03:06) >    IMPRESSION:   1.  Acute minimally displaced fracture through the medial wall of the   left acetabulum.  2. Acute nondisplaced fracture through the roof and anterior lip of the   left acetabulum.  3.  Acute nondisplaced fracture through the left inferior pubic ramus.      < from: CT Head No Cont (12.19.18 @ 00:02) >    IMPRESSION:    CT BRAIN: No acute intracranial hemorrhage or mass effect. Chronic changes    CERVICAL SPINE CT: No acute cervical spine fracture. Degenerative changes    8 mm right upper lobe pulmonary nodule.Nonemergent chest CT recommended.        < end of copied text >
No

## 2023-08-16 NOTE — PATIENT PROFILE ADULT - NSPROGENANESREACTION_GEN_A_NUR
Price (Do Not Change): 0.00 Instructions: This plan will send the code [email or web address] to the PM system.  DO NOT or CHANGE the price. Detail Level: Simple ALEJANDRINA 2/2 cognitive impairment

## 2023-12-20 NOTE — CONSULT NOTE ADULT - CONSULT REASON
[facility] pharmacy unable to fill Percocet. New sript sent to diff pharmacy.  
Lumbar fracture
Vertebral compression fx

## 2024-06-03 NOTE — PATIENT PROFILE ADULT - FALL HARM RISK
bones(Osteoporosis,prev fx,steroid use,metastatic bone ca)/age(85 years old or older)

## 2024-07-12 NOTE — PATIENT PROFILE ADULT - BRADEN SENSORY
liability for your use of this information.       Patient Education        Learning About Cutting Calories  How do calories affect your weight?  Food gives your body energy. Energy from the food you eat is measured in calories. This energy keeps your heart beating, your brain active, and your muscles working.  Your body needs a certain number of calories each day. After your body uses the calories it needs, it stores extra calories as fat.  To lose weight safely, you have to eat fewer calories while eating in a healthy way.  How many calories do you need each day?  The more active you are, the more calories you need. When you are less active, you need fewer calories. How many calories you need each day also depends on several things, including your age and whether you are male or female.  Here are some general guidelines for adults:  Less active women and older adults need 1,600 to 2,000 calories each day.  Active women and less active men need 2,000 to 2,400 calories each day.  Active men need 2,400 to 3,000 calories each day.  How can you cut calories and eat healthy meals?  Whole grains, vegetables and fruits, and dried beans are good lower-calorie foods. They give you lots of nutrients and fiber. And they fill you up.  Sweets, energy drinks, and soda pop are high in calories. They give you few nutrients and no fiber. Try to limit soda pop, fruit juice, and energy drinks. Drink water instead.  Some fats can be part of a healthy diet. But cutting back on fats from highly processed foods like fast foods and many snack foods is a good way to lower the calories in your diet. Also, use smaller amounts of fats like butter, margarine, salad dressing, and mayonnaise. Add fresh garlic, lemon, or herbs to your meals to add flavor without adding fat.  Meats and dairy products can be a big source of hidden fats. Try to choose lean or low-fat versions of these products.  Fat-free cookies, candies, chips, and frozen treats can 
(3) slightly limited